# Patient Record
Sex: MALE | Race: WHITE | NOT HISPANIC OR LATINO | Employment: OTHER | URBAN - METROPOLITAN AREA
[De-identification: names, ages, dates, MRNs, and addresses within clinical notes are randomized per-mention and may not be internally consistent; named-entity substitution may affect disease eponyms.]

---

## 2017-01-13 ENCOUNTER — HISTORICAL (OUTPATIENT)
Dept: RESPIRATORY THERAPY | Facility: HOSPITAL | Age: 77
End: 2017-01-13

## 2017-01-23 ENCOUNTER — HISTORICAL (OUTPATIENT)
Dept: LAB | Facility: HOSPITAL | Age: 77
End: 2017-01-23

## 2017-01-30 ENCOUNTER — HISTORICAL (OUTPATIENT)
Dept: LAB | Facility: HOSPITAL | Age: 77
End: 2017-01-30

## 2017-03-13 ENCOUNTER — HISTORICAL (OUTPATIENT)
Dept: LAB | Facility: HOSPITAL | Age: 77
End: 2017-03-13

## 2017-04-13 ENCOUNTER — HISTORICAL (OUTPATIENT)
Dept: LAB | Facility: HOSPITAL | Age: 77
End: 2017-04-13

## 2017-06-12 ENCOUNTER — HISTORICAL (OUTPATIENT)
Dept: RADIOLOGY | Facility: HOSPITAL | Age: 77
End: 2017-06-12

## 2017-06-28 ENCOUNTER — HISTORICAL (OUTPATIENT)
Dept: LAB | Facility: HOSPITAL | Age: 77
End: 2017-06-28

## 2017-06-28 LAB
INR PPP: 1.78
PROTHROMBIN TIME: 18.1 SECOND(S) (ref 9–11.5)

## 2017-11-15 ENCOUNTER — HISTORICAL (OUTPATIENT)
Dept: RADIOLOGY | Facility: HOSPITAL | Age: 77
End: 2017-11-15

## 2017-11-15 LAB
BUN SERPL-MCNC: 25 MG/DL (ref 7–18)
CALCIUM SERPL-MCNC: 9.2 MG/DL (ref 8.5–10.1)
CHLORIDE SERPL-SCNC: 104 MMOL/L (ref 98–107)
CO2 SERPL-SCNC: 31.1 MMOL/L (ref 21–32)
CREAT SERPL-MCNC: 1.3 MG/DL (ref 0.7–1.3)
DIGOXIN SERPL-MCNC: 1.12 NG/ML (ref 0.9–2)
GLUCOSE SERPL-MCNC: 100 MG/DL (ref 74–106)
POTASSIUM SERPL-SCNC: 4.4 MMOL/L (ref 3.5–5.1)
SODIUM SERPL-SCNC: 141 MMOL/L (ref 136–145)
TSH SERPL-ACNC: 3.22 MIU/ML (ref 0.35–3.75)

## 2018-04-03 ENCOUNTER — HISTORICAL (OUTPATIENT)
Dept: RADIOLOGY | Facility: HOSPITAL | Age: 78
End: 2018-04-03

## 2018-04-24 ENCOUNTER — HISTORICAL (OUTPATIENT)
Dept: LAB | Facility: HOSPITAL | Age: 78
End: 2018-04-24

## 2018-04-24 LAB
BUN SERPL-MCNC: 19 MG/DL (ref 7–18)
CALCIUM SERPL-MCNC: 9 MG/DL (ref 8.5–10.1)
CHLORIDE SERPL-SCNC: 104 MMOL/L (ref 98–107)
CO2 SERPL-SCNC: 28.2 MMOL/L (ref 21–32)
CREAT SERPL-MCNC: 1.01 MG/DL (ref 0.7–1.3)
CREAT/UREA NIT SERPL: 19
GLUCOSE SERPL-MCNC: 110 MG/DL (ref 74–106)
POTASSIUM SERPL-SCNC: 4.2 MMOL/L (ref 3.5–5.1)
SODIUM SERPL-SCNC: 138 MMOL/L (ref 136–145)

## 2018-05-30 ENCOUNTER — HISTORICAL (OUTPATIENT)
Dept: LAB | Facility: HOSPITAL | Age: 78
End: 2018-05-30

## 2018-05-30 LAB
INR PPP: 2.6
PROTHROMBIN TIME: 26.8 SECOND(S) (ref 9.3–11.4)

## 2018-07-20 ENCOUNTER — HISTORICAL (OUTPATIENT)
Dept: LAB | Facility: HOSPITAL | Age: 78
End: 2018-07-20

## 2018-07-20 LAB
BUN SERPL-MCNC: 28 MG/DL (ref 7–18)
CALCIUM SERPL-MCNC: 8.7 MG/DL (ref 8.5–10.1)
CHLORIDE SERPL-SCNC: 104 MMOL/L (ref 98–107)
CO2 SERPL-SCNC: 26 MMOL/L (ref 21–32)
CREAT SERPL-MCNC: 1.14 MG/DL (ref 0.7–1.3)
CREAT/UREA NIT SERPL: 25
GLUCOSE SERPL-MCNC: 136 MG/DL (ref 74–106)
POTASSIUM SERPL-SCNC: 4 MMOL/L (ref 3.5–5.1)
SODIUM SERPL-SCNC: 139 MMOL/L (ref 136–145)

## 2018-10-11 ENCOUNTER — HISTORICAL (OUTPATIENT)
Dept: LAB | Facility: HOSPITAL | Age: 78
End: 2018-10-11

## 2018-10-11 LAB
INR PPP: 3
PROTHROMBIN TIME: 30.2 SECOND(S) (ref 9.3–11.4)

## 2018-10-24 ENCOUNTER — TELEPHONE (OUTPATIENT)
Dept: ELECTROPHYSIOLOGY | Facility: CLINIC | Age: 78
End: 2018-10-24

## 2018-10-24 RX ORDER — FUROSEMIDE 40 MG/1
40 TABLET ORAL DAILY
Refills: 5 | Status: ON HOLD | COMMUNITY
Start: 2018-09-12 | End: 2018-11-13 | Stop reason: SDUPTHER

## 2018-10-24 RX ORDER — DIGOXIN 125 MCG
125 TABLET ORAL DAILY
COMMUNITY

## 2018-10-24 RX ORDER — DOCUSATE SODIUM 100 MG/1
100 CAPSULE, LIQUID FILLED ORAL 2 TIMES DAILY PRN
COMMUNITY

## 2018-10-24 RX ORDER — ACETAMINOPHEN 500 MG
1 TABLET ORAL DAILY
COMMUNITY

## 2018-10-24 RX ORDER — HYDROCODONE BITARTRATE AND ACETAMINOPHEN 5; 325 MG/1; MG/1
1 TABLET ORAL
Refills: 0 | Status: ON HOLD | COMMUNITY
Start: 2018-10-02 | End: 2018-11-13 | Stop reason: HOSPADM

## 2018-10-24 RX ORDER — DILTIAZEM HYDROCHLORIDE 360 MG/1
360 CAPSULE, EXTENDED RELEASE ORAL DAILY
Refills: 6 | Status: ON HOLD | COMMUNITY
Start: 2018-10-16 | End: 2018-11-13 | Stop reason: HOSPADM

## 2018-10-24 RX ORDER — LEVOTHYROXINE SODIUM 50 UG/1
50 CAPSULE ORAL DAILY
COMMUNITY

## 2018-10-24 RX ORDER — ATORVASTATIN CALCIUM 80 MG/1
40 TABLET, FILM COATED ORAL NIGHTLY
COMMUNITY

## 2018-10-24 RX ORDER — CARVEDILOL 25 MG/1
25 TABLET ORAL 2 TIMES DAILY
Refills: 6 | Status: ON HOLD | COMMUNITY
Start: 2018-10-09 | End: 2018-11-13 | Stop reason: HOSPADM

## 2018-10-24 RX ORDER — AMIODARONE HYDROCHLORIDE 200 MG/1
200 TABLET ORAL DAILY
Refills: 6 | COMMUNITY
Start: 2018-10-22

## 2018-10-24 RX ORDER — WARFARIN 2.5 MG/1
2.5 TABLET ORAL
COMMUNITY

## 2018-10-24 RX ORDER — CEPHALEXIN 500 MG/1
500 CAPSULE ORAL DAILY
Refills: 0 | COMMUNITY
Start: 2018-10-02 | End: 2018-10-29

## 2018-10-24 RX ORDER — PRAMIPEXOLE DIHYDROCHLORIDE 0.12 MG/1
0.12 TABLET ORAL NIGHTLY
Refills: 5 | COMMUNITY
Start: 2018-09-09 | End: 2018-10-29

## 2018-10-24 RX ORDER — PAROXETINE HYDROCHLORIDE 20 MG/1
20 TABLET, FILM COATED ORAL 2 TIMES DAILY
Refills: 3 | COMMUNITY
Start: 2018-08-16

## 2018-10-24 RX ORDER — EPLERENONE 25 MG/1
25 TABLET, FILM COATED ORAL DAILY
Refills: 3 | COMMUNITY
Start: 2018-10-05

## 2018-10-24 RX ORDER — MULTIVITAMIN
1 TABLET ORAL DAILY
COMMUNITY

## 2018-10-24 RX ORDER — IBUPROFEN 100 MG/5ML
1000 SUSPENSION, ORAL (FINAL DOSE FORM) ORAL DAILY
COMMUNITY

## 2018-10-24 NOTE — TELEPHONE ENCOUNTER
Per Lesia Stearns.  I called  And spoke to Mrs. Mathews , and we scheduled her  an appointment to come in and see  .  Patients  Wife requested 11-08-18.  Dr. Cory Mehta referred patient to be evaluated for RFA.  All  Patient medical records here in Epic.

## 2018-10-29 ENCOUNTER — INITIAL CONSULT (OUTPATIENT)
Dept: ELECTROPHYSIOLOGY | Facility: CLINIC | Age: 78
End: 2018-10-29
Payer: MEDICARE

## 2018-10-29 ENCOUNTER — TELEPHONE (OUTPATIENT)
Dept: ELECTROPHYSIOLOGY | Facility: CLINIC | Age: 78
End: 2018-10-29

## 2018-10-29 ENCOUNTER — HOSPITAL ENCOUNTER (OUTPATIENT)
Dept: CARDIOLOGY | Facility: CLINIC | Age: 78
Discharge: HOME | End: 2018-10-29
Payer: MEDICARE

## 2018-10-29 VITALS
WEIGHT: 169.06 LBS | SYSTOLIC BLOOD PRESSURE: 110 MMHG | DIASTOLIC BLOOD PRESSURE: 72 MMHG | BODY MASS INDEX: 22.9 KG/M2 | HEIGHT: 72 IN | HEART RATE: 87 BPM

## 2018-10-29 DIAGNOSIS — Z95.2 S/P AVR (AORTIC VALVE REPLACEMENT): ICD-10-CM

## 2018-10-29 DIAGNOSIS — I47.20 VENTRICULAR TACHYCARDIA: Primary | ICD-10-CM

## 2018-10-29 DIAGNOSIS — I49.9 CARDIAC ARRHYTHMIA, UNSPECIFIED CARDIAC ARRHYTHMIA TYPE: ICD-10-CM

## 2018-10-29 DIAGNOSIS — Z79.01 CURRENT USE OF LONG TERM ANTICOAGULATION: ICD-10-CM

## 2018-10-29 DIAGNOSIS — I42.9 CARDIOMYOPATHY, UNSPECIFIED TYPE: ICD-10-CM

## 2018-10-29 DIAGNOSIS — I47.20 VENTRICULAR TACHYCARDIA: ICD-10-CM

## 2018-10-29 DIAGNOSIS — I47.20 VT (VENTRICULAR TACHYCARDIA): Primary | ICD-10-CM

## 2018-10-29 DIAGNOSIS — Z95.1 HX OF CABG: ICD-10-CM

## 2018-10-29 DIAGNOSIS — I48.20 CHRONIC ATRIAL FIBRILLATION: ICD-10-CM

## 2018-10-29 PROCEDURE — 99999 PR PBB SHADOW E&M-EST. PATIENT-LVL IV: CPT | Mod: PBBFAC,,, | Performed by: INTERNAL MEDICINE

## 2018-10-29 PROCEDURE — 93010 ELECTROCARDIOGRAM REPORT: CPT | Mod: S$PBB,,, | Performed by: INTERNAL MEDICINE

## 2018-10-29 PROCEDURE — 93005 ELECTROCARDIOGRAM TRACING: CPT | Mod: PBBFAC | Performed by: INTERNAL MEDICINE

## 2018-10-29 PROCEDURE — 99214 OFFICE O/P EST MOD 30 MIN: CPT | Mod: PBBFAC | Performed by: INTERNAL MEDICINE

## 2018-10-29 PROCEDURE — 99205 OFFICE O/P NEW HI 60 MIN: CPT | Mod: S$PBB,,, | Performed by: INTERNAL MEDICINE

## 2018-10-29 RX ORDER — MELATONIN 5 MG
5 CAPSULE ORAL NIGHTLY
COMMUNITY
End: 2019-02-18

## 2018-10-29 NOTE — PROGRESS NOTES
"Subjective:    Patient ID:  Donavan Mathews is a 78 y.o. male who presents for {Misc; evaluation/follow-up:20501::"follow-up"} of No chief complaint on file.      HPI    Referred by Dr. Jose Mehta [phone]    Ischemic cardiomyopathy, device placed at [facility], subsequent infection and removal, a couple surgeries, now has CRT-D. Has also seen Lanette Cabrera in the past. Also restrictive lung disease possibly due to Amio. Off Amio 5 months ago and maxed out on beta blockers.     Now getting shocked for MMVT. Good sized inferior scar. Back on Amio (loaded about 1 week ago). Still having VT.        ROS     Objective:    Physical Exam      Assessment:       No diagnosis found.     Plan:                   "

## 2018-10-29 NOTE — LETTER
October 29, 2018      Cory Mehta MD  [address] [place] [address] Dr Meaghan [address] [id number]           [facility]  [address]  [facility] [address]  Phone: [phone]  Fax: [phone]          Patient: Donavan Mathews   MR Number: [id number]   YOB: 1940   Date of Visit: 10/29/2018       Dear Dr. Cory Mehta:    Thank you for referring Donavan Mathews to me for evaluation. Attached you will find relevant portions of my assessment and plan of care.    If you have questions, please do not hesitate to call me. I look forward to following Donavan Mathews along with you.    Sincerely,    Madi Aguayo MD    Enclosure  CC:  No Recipients    If you would like to receive this communication electronically, please contact [email or web address] or [phone] to request more information on [employer] Link access.    For providers and/or their staff who would like to refer a patient to [facility], please contact us through our one-stop-shop provider referral line, [facility] , at [phone].    If you feel you have received this communication in error or would no longer like to receive these types of communications, please e-mail [email or web address]
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
## 2018-11-01 DIAGNOSIS — I48.20 CHRONIC ATRIAL FIBRILLATION: Primary | ICD-10-CM

## 2018-11-02 ENCOUNTER — TELEPHONE (OUTPATIENT)
Dept: ELECTROPHYSIOLOGY | Facility: CLINIC | Age: 78
End: 2018-11-02

## 2018-11-02 ENCOUNTER — HISTORICAL (OUTPATIENT)
Dept: LAB | Facility: HOSPITAL | Age: 78
End: 2018-11-02

## 2018-11-02 LAB
ABS NEUT (OLG): 7.22
APTT PPP: 40.5 SECOND(S) (ref 24.5–32.8)
BASOPHILS # BLD AUTO: 0.01 X10(3)/MCL
BASOPHILS NFR BLD AUTO: 0.1 %
BUN SERPL-MCNC: 26 MG/DL (ref 7–18)
CALCIUM SERPL-MCNC: 8.7 MG/DL (ref 8.5–10.1)
CHLORIDE SERPL-SCNC: 102 MMOL/L (ref 98–107)
CO2 SERPL-SCNC: 29.5 MMOL/L (ref 21–32)
CREAT SERPL-MCNC: 1.23 MG/DL (ref 0.7–1.3)
CREAT/UREA NIT SERPL: 21
EOSINOPHIL # BLD AUTO: 0.11 X10(3)/MCL
EOSINOPHIL NFR BLD AUTO: 1.2 %
ERYTHROCYTE [DISTWIDTH] IN BLOOD BY AUTOMATED COUNT: 15 %
GLUCOSE SERPL-MCNC: 89 MG/DL (ref 74–106)
HCT VFR BLD AUTO: 43.8 % (ref 39–49)
HGB BLD-MCNC: 14.9 GM/DL (ref 12.6–16.6)
IMM GRANULOCYTES # BLD AUTO: 0.02 10*3/UL (ref 0–0.1)
IMM GRANULOCYTES NFR BLD AUTO: 0.2 % (ref 0–1)
INR PPP: 4.2
LYMPHOCYTES # BLD AUTO: 0.71 X10(3)/MCL
LYMPHOCYTES NFR BLD AUTO: 7.9 %
MCH RBC QN AUTO: 30.8 PG (ref 27–33)
MCHC RBC AUTO-ENTMCNC: 34 GM/DL (ref 32–35)
MCV RBC AUTO: 90.7 FL (ref 84–97)
MONOCYTES # BLD AUTO: 0.92 X10(3)/MCL
MONOCYTES NFR BLD AUTO: 10.2 %
NEUTROPHILS # BLD AUTO: 7.22 X10(3)/MCL
NEUTROPHILS NFR BLD AUTO: 80.4 %
PLATELET # BLD AUTO: 183 X10(3)/MCL (ref 151–368)
PMV BLD AUTO: 11 FL
POTASSIUM SERPL-SCNC: 4.1 MMOL/L (ref 3.5–5.1)
PROTHROMBIN TIME: 37.5 SECOND(S) (ref 8.6–10.1)
RBC # BLD AUTO: 4.83 X10(6)/MCL (ref 4.3–5.6)
SODIUM SERPL-SCNC: 138 MMOL/L (ref 136–145)
WBC # SPEC AUTO: 8.99 X10(3)/MCL (ref 3.4–9.2)

## 2018-11-02 NOTE — TELEPHONE ENCOUNTER
Returned call to Consuelo. Orders faxed over with signatures. Electronic orders not accepted.         ----- Message from Sabrina Gaspar MA sent at 11/2/2018  8:13 AM CDT -----  Contact: Consuelo Cordero from [facility] would like to talk to you about this patient. Please call [phone]. Thank you.

## 2018-11-07 ENCOUNTER — ANESTHESIA (OUTPATIENT)
Dept: MEDSURG UNIT | Facility: HOSPITAL | Age: 78
DRG: 273 | End: 2018-11-07
Payer: MEDICARE

## 2018-11-07 ENCOUNTER — HOSPITAL ENCOUNTER (INPATIENT)
Facility: HOSPITAL | Age: 78
LOS: 6 days | Discharge: HOME | DRG: 273 | End: 2018-11-13
Attending: INTERNAL MEDICINE | Admitting: INTERNAL MEDICINE
Payer: MEDICARE

## 2018-11-07 ENCOUNTER — ANESTHESIA EVENT (OUTPATIENT)
Dept: MEDSURG UNIT | Facility: HOSPITAL | Age: 78
DRG: 273 | End: 2018-11-07
Payer: MEDICARE

## 2018-11-07 ENCOUNTER — HOSPITAL ENCOUNTER (OUTPATIENT)
Dept: CARDIOLOGY | Facility: CLINIC | Age: 78
Discharge: HOME | DRG: 273 | End: 2018-11-07
Attending: INTERNAL MEDICINE
Payer: MEDICARE

## 2018-11-07 VITALS
SYSTOLIC BLOOD PRESSURE: 110 MMHG | WEIGHT: 169 LBS | HEIGHT: 72 IN | DIASTOLIC BLOOD PRESSURE: 72 MMHG | BODY MASS INDEX: 22.89 KG/M2

## 2018-11-07 DIAGNOSIS — I49.9 ARRHYTHMIA: ICD-10-CM

## 2018-11-07 DIAGNOSIS — I47.20 VT (VENTRICULAR TACHYCARDIA): Primary | ICD-10-CM

## 2018-11-07 DIAGNOSIS — I42.9 CARDIOMYOPATHY, UNSPECIFIED TYPE: ICD-10-CM

## 2018-11-07 DIAGNOSIS — I47.20 VT (VENTRICULAR TACHYCARDIA): ICD-10-CM

## 2018-11-07 DIAGNOSIS — Z95.1 HX OF CABG: ICD-10-CM

## 2018-11-07 DIAGNOSIS — I49.9 CARDIAC ARRHYTHMIA, UNSPECIFIED CARDIAC ARRHYTHMIA TYPE: ICD-10-CM

## 2018-11-07 LAB
ABO + RH BLD: NORMAL
AORTIC ARCH: 5.5 CM
ASCENDING AORTA: 5.09 CM
AV MEAN GRADIENT: 5.75 MMHG
AV PEAK GRADIENT: 11.02 MMHG
AV VALVE AREA: 2.04 CM2
BLD GP AB SCN CELLS X3 SERPL QL: NORMAL
BSA FOR ECHO PROCEDURE: 1.97 M2
CV ECHO LV RWT: 0.39 CM
DOP CALC AO PEAK VEL: 1.66 M/S
DOP CALC AO VTI: 29.19 CM
DOP CALC LVOT AREA: 4.67 CM2
DOP CALC LVOT DIAMETER: 2.44 CM
DOP CALC LVOT STROKE VOLUME: 59.45 CM3
DOP CALCLVOT PEAK VEL VTI: 12.72 CM
E/E' RATIO: 15.2
ECHO LV POSTERIOR WALL: 1.14 CM (ref 0.6–1.1)
FRACTIONAL SHORTENING: 17 % (ref 28–44)
INR PPP: 3
INTERVENTRICULAR SEPTUM: 1.14 CM (ref 0.6–1.1)
LA MAJOR: 7.05 CM
LA MINOR: 6.94 CM
LA WIDTH: 5.14 CM
LEFT ATRIUM SIZE: 5.43 CM
LEFT ATRIUM VOLUME INDEX: 84.2 ML/M2
LEFT ATRIUM VOLUME: 165.94 CM3
LEFT INTERNAL DIMENSION IN SYSTOLE: 4.82 CM (ref 2.1–4)
LEFT VENTRICLE DIASTOLIC VOLUME INDEX: 85.96 ML/M2
LEFT VENTRICLE DIASTOLIC VOLUME: 169.35 ML
LEFT VENTRICLE MASS INDEX: 142.3 G/M2
LEFT VENTRICLE SYSTOLIC VOLUME INDEX: 55 ML/M2
LEFT VENTRICLE SYSTOLIC VOLUME: 108.35 ML
LEFT VENTRICULAR INTERNAL DIMENSION IN DIASTOLE: 5.84 CM (ref 3.5–6)
LEFT VENTRICULAR MASS: 280.33 G
LV LATERAL E/E' RATIO: 11.4
LV SEPTAL E/E' RATIO: 22.8
MV PEAK E VEL: 1.14 M/S
PISA TR MAX VEL: 3.19 M/S
PROTHROMBIN TIME: 29.2 SEC
PROX AORTA: 5 CM
PULM VEIN S/D RATIO: 0.65
PV PEAK D VEL: 0.78 M/S
PV PEAK S VEL: 0.51 M/S
RA MAJOR: 6.16 CM
RA PRESSURE: 8 MMHG
RA WIDTH: 4.64 CM
RIGHT VENTRICULAR END-DIASTOLIC DIMENSION: 4.11 CM
SINUS: 3.45 CM
STJ: 2.67 CM
TDI LATERAL: 0.1
TDI SEPTAL: 0.05
TDI: 0.08
TR MAX PG: 40.7 MMHG
TRICUSPID ANNULAR PLANE SYSTOLIC EXCURSION: 1.66 CM
TV REST PULMONARY ARTERY PRESSURE: 48.7 MMHG

## 2018-11-07 PROCEDURE — 27200188 HC TRANSDUCER, ART ADULT/PEDS

## 2018-11-07 PROCEDURE — C1732 CATH, EP, DIAG/ABL, 3D/VECT: HCPCS | Performed by: INTERNAL MEDICINE

## 2018-11-07 PROCEDURE — 25500020 PHARM REV CODE 255: Performed by: INTERNAL MEDICINE

## 2018-11-07 PROCEDURE — 93306 TTE W/DOPPLER COMPLETE: CPT | Mod: 26,S$PBB,, | Performed by: INTERNAL MEDICINE

## 2018-11-07 PROCEDURE — 63600175 PHARM REV CODE 636 W HCPCS: Performed by: NURSE ANESTHETIST, CERTIFIED REGISTERED

## 2018-11-07 PROCEDURE — 36620 INSERTION CATHETER ARTERY: CPT | Mod: 59,,, | Performed by: ANESTHESIOLOGY

## 2018-11-07 PROCEDURE — 51701 INSERT BLADDER CATHETER: CPT

## 2018-11-07 PROCEDURE — 92960 CARDIOVERSION ELECTRIC EXT: CPT | Mod: 59,,, | Performed by: INTERNAL MEDICINE

## 2018-11-07 PROCEDURE — 33225 L VENTRIC PACING LEAD ADD-ON: CPT

## 2018-11-07 PROCEDURE — 02583ZZ DESTRUCTION OF CONDUCTION MECHANISM, PERCUTANEOUS APPROACH: ICD-10-PCS | Performed by: INTERNAL MEDICINE

## 2018-11-07 PROCEDURE — 94761 N-INVAS EAR/PLS OXIMETRY MLT: CPT

## 2018-11-07 PROCEDURE — 93005 ELECTROCARDIOGRAM TRACING: CPT

## 2018-11-07 PROCEDURE — 27201423 OPTIME MED/SURG SUP & DEVICES STERILE SUPPLY: Performed by: INTERNAL MEDICINE

## 2018-11-07 PROCEDURE — 25000003 PHARM REV CODE 250: Performed by: NURSE ANESTHETIST, CERTIFIED REGISTERED

## 2018-11-07 PROCEDURE — 93010 ELECTROCARDIOGRAM REPORT: CPT | Mod: ,,, | Performed by: INTERNAL MEDICINE

## 2018-11-07 PROCEDURE — 93662 INTRACARDIAC ECG (ICE): CPT | Performed by: INTERNAL MEDICINE

## 2018-11-07 PROCEDURE — 27000221 HC OXYGEN, UP TO 24 HOURS

## 2018-11-07 PROCEDURE — 25000003 PHARM REV CODE 250: Performed by: ANESTHESIOLOGY

## 2018-11-07 PROCEDURE — C1894 INTRO/SHEATH, NON-LASER: HCPCS | Performed by: INTERNAL MEDICINE

## 2018-11-07 PROCEDURE — 93621 COMP EP EVL L PAC&REC C SINS: CPT | Performed by: INTERNAL MEDICINE

## 2018-11-07 PROCEDURE — 93662 INTRACARDIAC ECG (ICE): CPT | Mod: 26,,, | Performed by: INTERNAL MEDICINE

## 2018-11-07 PROCEDURE — C1753 CATH, INTRAVAS ULTRASOUND: HCPCS | Performed by: INTERNAL MEDICINE

## 2018-11-07 PROCEDURE — C1894 INTRO/SHEATH, NON-LASER: HCPCS

## 2018-11-07 PROCEDURE — 86850 RBC ANTIBODY SCREEN: CPT

## 2018-11-07 PROCEDURE — C1730 CATH, EP, 19 OR FEW ELECT: HCPCS | Performed by: INTERNAL MEDICINE

## 2018-11-07 PROCEDURE — 93462 L HRT CATH TRNSPTL PUNCTURE: CPT | Performed by: INTERNAL MEDICINE

## 2018-11-07 PROCEDURE — C1725 CATH, TRANSLUMIN NON-LASER: HCPCS | Performed by: INTERNAL MEDICINE

## 2018-11-07 PROCEDURE — 20000000 HC ICU ROOM

## 2018-11-07 PROCEDURE — D9220A PRA ANESTHESIA: Mod: ANES,,, | Performed by: ANESTHESIOLOGY

## 2018-11-07 PROCEDURE — 85610 PROTHROMBIN TIME: CPT

## 2018-11-07 PROCEDURE — 25000003 PHARM REV CODE 250: Performed by: INTERNAL MEDICINE

## 2018-11-07 PROCEDURE — 36620 INSERTION CATHETER ARTERY: CPT

## 2018-11-07 PROCEDURE — 63600175 PHARM REV CODE 636 W HCPCS: Performed by: INTERNAL MEDICINE

## 2018-11-07 PROCEDURE — 37000008 HC ANESTHESIA 1ST 15 MINUTES: Performed by: INTERNAL MEDICINE

## 2018-11-07 PROCEDURE — C8929 TTE W OR WO FOL WCON,DOPPLER: HCPCS | Mod: PBBFAC | Performed by: INTERNAL MEDICINE

## 2018-11-07 PROCEDURE — D9220A PRA ANESTHESIA: Mod: CRNA,,, | Performed by: NURSE ANESTHETIST, CERTIFIED REGISTERED

## 2018-11-07 PROCEDURE — 02K83ZZ MAP CONDUCTION MECHANISM, PERCUTANEOUS APPROACH: ICD-10-PCS | Performed by: INTERNAL MEDICINE

## 2018-11-07 PROCEDURE — 93010 ELECTROCARDIOGRAM REPORT: CPT | Mod: 76,,, | Performed by: INTERNAL MEDICINE

## 2018-11-07 PROCEDURE — 27201037 HC PRESSURE MONITORING SET UP

## 2018-11-07 PROCEDURE — 93462 L HRT CATH TRNSPTL PUNCTURE: CPT | Mod: ,,, | Performed by: INTERNAL MEDICINE

## 2018-11-07 PROCEDURE — 37000009 HC ANESTHESIA EA ADD 15 MINS: Performed by: INTERNAL MEDICINE

## 2018-11-07 PROCEDURE — 92960 CARDIOVERSION ELECTRIC EXT: CPT | Mod: 59 | Performed by: INTERNAL MEDICINE

## 2018-11-07 PROCEDURE — 63600175 PHARM REV CODE 636 W HCPCS: Performed by: ANESTHESIOLOGY

## 2018-11-07 PROCEDURE — 93654 COMPRE EP EVAL TX VT: CPT | Performed by: INTERNAL MEDICINE

## 2018-11-07 PROCEDURE — 94002 VENT MGMT INPAT INIT DAY: CPT

## 2018-11-07 PROCEDURE — 51702 INSERT TEMP BLADDER CATH: CPT

## 2018-11-07 PROCEDURE — 99900026 HC AIRWAY MAINTENANCE (STAT)

## 2018-11-07 PROCEDURE — 93621 COMP EP EVL L PAC&REC C SINS: CPT | Mod: 26,,, | Performed by: INTERNAL MEDICINE

## 2018-11-07 PROCEDURE — 93654 COMPRE EP EVAL TX VT: CPT | Mod: ,,, | Performed by: INTERNAL MEDICINE

## 2018-11-07 PROCEDURE — 5A2204Z RESTORATION OF CARDIAC RHYTHM, SINGLE: ICD-10-PCS | Performed by: INTERNAL MEDICINE

## 2018-11-07 RX ORDER — PROTAMINE SULFATE 10 MG/ML
INJECTION, SOLUTION INTRAVENOUS
Status: DISCONTINUED | OUTPATIENT
Start: 2018-11-07 | End: 2018-11-07

## 2018-11-07 RX ORDER — HEPARIN SODIUM 1000 [USP'U]/ML
INJECTION, SOLUTION INTRAVENOUS; SUBCUTANEOUS
Status: DISCONTINUED | OUTPATIENT
Start: 2018-11-07 | End: 2018-11-07

## 2018-11-07 RX ORDER — FENTANYL CITRATE 50 UG/ML
25 INJECTION, SOLUTION INTRAMUSCULAR; INTRAVENOUS EVERY 5 MIN PRN
Status: DISCONTINUED | OUTPATIENT
Start: 2018-11-07 | End: 2018-11-07 | Stop reason: HOSPADM

## 2018-11-07 RX ORDER — EPINEPHRINE 0.1 MG/ML
INJECTION INTRAVENOUS
Status: DISCONTINUED | OUTPATIENT
Start: 2018-11-07 | End: 2018-11-07

## 2018-11-07 RX ORDER — ONDANSETRON 2 MG/ML
4 INJECTION INTRAMUSCULAR; INTRAVENOUS DAILY PRN
Status: DISCONTINUED | OUTPATIENT
Start: 2018-11-07 | End: 2018-11-07 | Stop reason: HOSPADM

## 2018-11-07 RX ORDER — ROCURONIUM BROMIDE 10 MG/ML
INJECTION, SOLUTION INTRAVENOUS
Status: DISCONTINUED | OUTPATIENT
Start: 2018-11-07 | End: 2018-11-07

## 2018-11-07 RX ORDER — PAROXETINE HYDROCHLORIDE 20 MG/1
20 TABLET, FILM COATED ORAL DAILY
Status: DISCONTINUED | OUTPATIENT
Start: 2018-11-08 | End: 2018-11-13 | Stop reason: HOSPADM

## 2018-11-07 RX ORDER — LEVOTHYROXINE SODIUM 75 UG/1
75 TABLET ORAL DAILY
Status: DISCONTINUED | OUTPATIENT
Start: 2018-11-08 | End: 2018-11-13 | Stop reason: HOSPADM

## 2018-11-07 RX ORDER — SODIUM CHLORIDE 9 MG/ML
INJECTION, SOLUTION INTRAVENOUS CONTINUOUS
Status: DISCONTINUED | OUTPATIENT
Start: 2018-11-07 | End: 2018-11-08

## 2018-11-07 RX ORDER — SODIUM CHLORIDE 0.9 % (FLUSH) 0.9 %
3 SYRINGE (ML) INJECTION
Status: DISCONTINUED | OUTPATIENT
Start: 2018-11-07 | End: 2018-11-13 | Stop reason: HOSPADM

## 2018-11-07 RX ORDER — LIDOCAINE HYDROCHLORIDE 20 MG/ML
INJECTION, SOLUTION INFILTRATION; PERINEURAL
Status: DISCONTINUED | OUTPATIENT
Start: 2018-11-07 | End: 2018-11-07 | Stop reason: HOSPADM

## 2018-11-07 RX ORDER — PROPOFOL 10 MG/ML
VIAL (ML) INTRAVENOUS CONTINUOUS PRN
Status: DISCONTINUED | OUTPATIENT
Start: 2018-11-07 | End: 2018-11-07

## 2018-11-07 RX ORDER — AMIODARONE HYDROCHLORIDE 200 MG/1
200 TABLET ORAL DAILY
Status: DISCONTINUED | OUTPATIENT
Start: 2018-11-07 | End: 2018-11-13 | Stop reason: HOSPADM

## 2018-11-07 RX ORDER — SODIUM CHLORIDE 9 MG/ML
INJECTION, SOLUTION INTRAVENOUS CONTINUOUS PRN
Status: DISCONTINUED | OUTPATIENT
Start: 2018-11-07 | End: 2018-11-07

## 2018-11-07 RX ORDER — MIDAZOLAM HYDROCHLORIDE 1 MG/ML
INJECTION, SOLUTION INTRAMUSCULAR; INTRAVENOUS
Status: DISCONTINUED | OUTPATIENT
Start: 2018-11-07 | End: 2018-11-07

## 2018-11-07 RX ORDER — HEPARIN SODIUM 10000 [USP'U]/100ML
INJECTION, SOLUTION INTRAVENOUS CONTINUOUS PRN
Status: DISCONTINUED | OUTPATIENT
Start: 2018-11-07 | End: 2018-11-07

## 2018-11-07 RX ORDER — CEFAZOLIN SODIUM 1 G/3ML
2 INJECTION, POWDER, FOR SOLUTION INTRAMUSCULAR; INTRAVENOUS ONCE
Status: COMPLETED | OUTPATIENT
Start: 2018-11-07 | End: 2018-11-07

## 2018-11-07 RX ORDER — FUROSEMIDE 10 MG/ML
40 INJECTION INTRAMUSCULAR; INTRAVENOUS ONCE
Status: DISCONTINUED | OUTPATIENT
Start: 2018-11-07 | End: 2018-11-13 | Stop reason: HOSPADM

## 2018-11-07 RX ORDER — WARFARIN 2.5 MG/1
2.5 TABLET ORAL DAILY
Status: DISCONTINUED | OUTPATIENT
Start: 2018-11-08 | End: 2018-11-08

## 2018-11-07 RX ORDER — FUROSEMIDE 10 MG/ML
INJECTION INTRAMUSCULAR; INTRAVENOUS
Status: DISCONTINUED | OUTPATIENT
Start: 2018-11-07 | End: 2018-11-07

## 2018-11-07 RX ORDER — FENTANYL CITRATE 50 UG/ML
INJECTION, SOLUTION INTRAMUSCULAR; INTRAVENOUS
Status: DISCONTINUED | OUTPATIENT
Start: 2018-11-07 | End: 2018-11-07

## 2018-11-07 RX ORDER — ETOMIDATE 2 MG/ML
INJECTION INTRAVENOUS
Status: DISCONTINUED | OUTPATIENT
Start: 2018-11-07 | End: 2018-11-07

## 2018-11-07 RX ORDER — HEPARIN SODIUM 200 [USP'U]/100ML
INJECTION, SOLUTION INTRAVENOUS
Status: DISCONTINUED | OUTPATIENT
Start: 2018-11-07 | End: 2018-11-09

## 2018-11-07 RX ORDER — FUROSEMIDE 40 MG/1
40 TABLET ORAL DAILY
Status: DISCONTINUED | OUTPATIENT
Start: 2018-11-08 | End: 2018-11-09

## 2018-11-07 RX ORDER — EPLERENONE 25 MG/1
25 TABLET, FILM COATED ORAL DAILY
Status: DISCONTINUED | OUTPATIENT
Start: 2018-11-08 | End: 2018-11-13 | Stop reason: HOSPADM

## 2018-11-07 RX ORDER — DIGOXIN 125 MCG
125 TABLET ORAL DAILY
Status: DISCONTINUED | OUTPATIENT
Start: 2018-11-08 | End: 2018-11-13 | Stop reason: HOSPADM

## 2018-11-07 RX ORDER — IODIXANOL 320 MG/ML
INJECTION, SOLUTION INTRAVASCULAR
Status: DISCONTINUED | OUTPATIENT
Start: 2018-11-07 | End: 2018-11-07 | Stop reason: HOSPADM

## 2018-11-07 RX ADMIN — MIDAZOLAM 2 MG: 1 INJECTION INTRAMUSCULAR; INTRAVENOUS at 07:11

## 2018-11-07 RX ADMIN — PROTAMINE SULFATE 10 MG: 10 INJECTION, SOLUTION INTRAVENOUS at 06:11

## 2018-11-07 RX ADMIN — EPINEPHRINE 0.02 MCG/KG/MIN: 1 INJECTION INTRAMUSCULAR; INTRAVENOUS; SUBCUTANEOUS at 04:11

## 2018-11-07 RX ADMIN — PROPOFOL 25 MCG/KG/MIN: 10 INJECTION, EMULSION INTRAVENOUS at 06:11

## 2018-11-07 RX ADMIN — HEPARIN SODIUM 1000 UNITS: 1000 INJECTION INTRAVENOUS; SUBCUTANEOUS at 05:11

## 2018-11-07 RX ADMIN — SODIUM CHLORIDE, SODIUM GLUCONATE, SODIUM ACETATE, POTASSIUM CHLORIDE, MAGNESIUM CHLORIDE, SODIUM PHOSPHATE, DIBASIC, AND POTASSIUM PHOSPHATE: .53; .5; .37; .037; .03; .012; .00082 INJECTION, SOLUTION INTRAVENOUS at 03:11

## 2018-11-07 RX ADMIN — HEPARIN SODIUM 8000 UNITS: 1000 INJECTION INTRAVENOUS; SUBCUTANEOUS at 04:11

## 2018-11-07 RX ADMIN — EPINEPHRINE 0.04 MCG/KG/MIN: 1 INJECTION INTRAMUSCULAR; INTRAVENOUS; SUBCUTANEOUS at 07:11

## 2018-11-07 RX ADMIN — ROCURONIUM BROMIDE 40 MG: 10 INJECTION, SOLUTION INTRAVENOUS at 03:11

## 2018-11-07 RX ADMIN — ETOMIDATE 12 MG: 2 INJECTION, SOLUTION INTRAVENOUS at 03:11

## 2018-11-07 RX ADMIN — SODIUM CHLORIDE: 9 INJECTION, SOLUTION INTRAVENOUS at 03:11

## 2018-11-07 RX ADMIN — CEFAZOLIN 2 G: 330 INJECTION, POWDER, FOR SOLUTION INTRAMUSCULAR; INTRAVENOUS at 03:11

## 2018-11-07 RX ADMIN — PROPOFOL 30 MCG/KG/MIN: 10 INJECTION, EMULSION INTRAVENOUS at 07:11

## 2018-11-07 RX ADMIN — EPINEPHRINE 5 MCG: 0.1 INJECTION, SOLUTION ENDOTRACHEAL; INTRACARDIAC; INTRAVENOUS at 04:11

## 2018-11-07 RX ADMIN — ETOMIDATE 4 MG: 2 INJECTION, SOLUTION INTRAVENOUS at 03:11

## 2018-11-07 RX ADMIN — FENTANYL CITRATE 25 MCG: 50 INJECTION INTRAMUSCULAR; INTRAVENOUS at 09:11

## 2018-11-07 RX ADMIN — ROCURONIUM BROMIDE 20 MG: 10 INJECTION, SOLUTION INTRAVENOUS at 05:11

## 2018-11-07 RX ADMIN — ROCURONIUM BROMIDE 20 MG: 10 INJECTION, SOLUTION INTRAVENOUS at 04:11

## 2018-11-07 RX ADMIN — FENTANYL CITRATE 50 MCG: 50 INJECTION, SOLUTION INTRAMUSCULAR; INTRAVENOUS at 03:11

## 2018-11-07 RX ADMIN — MIDAZOLAM 2 MG: 1 INJECTION INTRAMUSCULAR; INTRAVENOUS at 02:11

## 2018-11-07 RX ADMIN — HEPARIN SODIUM AND DEXTROSE 3000 UNITS/HR: 10000; 5 INJECTION INTRAVENOUS at 04:11

## 2018-11-07 RX ADMIN — EPINEPHRINE 10 MCG: 0.1 INJECTION, SOLUTION ENDOTRACHEAL; INTRACARDIAC; INTRAVENOUS at 04:11

## 2018-11-07 RX ADMIN — FENTANYL CITRATE 50 MCG: 50 INJECTION, SOLUTION INTRAMUSCULAR; INTRAVENOUS at 07:11

## 2018-11-07 RX ADMIN — FUROSEMIDE 40 MG: 10 INJECTION, SOLUTION INTRAMUSCULAR; INTRAVENOUS at 02:11

## 2018-11-07 NOTE — Clinical Note
We did not achieve adequate pacing and sensing parameters with threshold testing;therefore lead was repositioned in coronary sinus

## 2018-11-07 NOTE — INTERVAL H&P NOTE
The patient has been examined and the H&P has been reviewed:    I concur with the findings and no changes have occurred since H&P was written.      We discussed the procedural details, risks and benefits of ablation with the patient.  In addition to the procedural details, we also discussed that while unlikely, catheter ablation is associated with several potential complications including, but not limited to, infection, bleeding , vascular complications, myocardial infarction, stroke, pericardial effusion/tamponade and, rarely, death. Patient appeared to understand the whole discussion and verbalized that all questions were answered to satisfaction. After deliberating over the options, explanation, and risks/ benefits of the ablation procedure, patient agreed to proceed with the ablation.            Active Hospital Problems    Diagnosis  POA    VT (ventricular tachycardia) [I47.2]  Yes     pleomorphic - multiple monomorphic         Resolved Hospital Problems   No resolved problems to display.       Echo with contrast  : 11/18  · Eccentric LVH with LVEF of < 20%. Inferior inferoseptal dyskinesis.  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   · Echo contrast given with no evidence of thrombus  · RV mildly dilated with Low normal RV systolic function  · Biatrial enlargement  · Indeterminant diastolic function with increased LAP  ·  AV with a mean gradient of 6mmHg and DOI of 0.43; there is mild perivalvular AI noted  · Dilated ascending aorta and aortic arch 5 to 5.5 cm  · +3 MR  · Mild TR with estimated PAP of about 50mmHg    Resting Score Index: 2.53             ·

## 2018-11-07 NOTE — ANESTHESIA PROCEDURE NOTES
Arterial    Diagnosis: Ventricular tachycardia  Doctor requesting consult: Dede    Patient location during procedure: done in OR  Procedure start time: 11/7/2018 3:30 PM  Procedure end time: 11/7/2018 3:36 PM  Staffing  Anesthesiologist: Sixto Gutierres MD  Performed: anesthesiologist   Anesthesiologist was present at the time of the procedure.  Preanesthetic Checklist  Completed: patient identified, site marked, surgical consent, pre-op evaluation, timeout performed, IV checked, risks and benefits discussed, monitors and equipment checked and anesthesia consent givenArterial  Skin Prep: chlorhexidine gluconate and isopropyl alcohol  Local Infiltration: none  Orientation: left  Location: radial  Catheter Size: 20 G  Catheter placement by Anatomical landmarks. Heme positive aspiration all ports.Insertion Attempts: 1  Assessment  Dressing: secured with tape and tegaderm  Patient: Tolerated well

## 2018-11-07 NOTE — Clinical Note
Defib pads used to defibrillate patient out of VT. One shock delivered at 200J. Patient shocked back into paced rhythm.

## 2018-11-07 NOTE — ANESTHESIA PREPROCEDURE EVALUATION
11/07/2018  Donavan Mathews is a 78 y.o., male with ventricular tachycardia here for EP study and possible ablation.    Pre-operative evaluation for Procedure(s) (LRB):  ABLATION, FOR VENTRICULAR TACHYCARDIA (N/A)        Patient Active Problem List   Diagnosis    VT (ventricular tachycardia)    S/P AVR (aortic valve replacement)    Hx of CABG    Current use of long term anticoagulation    Cardiomyopathy    A-fib       Review of patient's allergies indicates:   Allergen Reactions    Bactrim [sulfamethoxazole-trimethoprim]     Plasma human        No current facility-administered medications on file prior to encounter.      Current Outpatient Medications on File Prior to Encounter   Medication Sig Dispense Refill    amiodarone (PACERONE) 200 MG Tab Take 200 mg by mouth once daily.  6    ascorbic acid, vitamin C, (VITAMIN C) 1000 MG tablet Take 1,000 mg by mouth once daily.      atorvastatin (LIPITOR) 80 MG tablet Take 80 mg by mouth every evening.      carvedilol (COREG) 25 MG tablet Take 25 mg by mouth 2 (two) times daily.  6    cholecalciferol, vitamin D3, (VITAMIN D3) 2,000 unit Cap Take 1 capsule by mouth once daily.      coenzyme Q10 75 mg Cap Take 75 mg by mouth once daily.      digoxin (LANOXIN) 125 mcg tablet Take 125 mcg by mouth once daily.      diltiaZEM (CARDIZEM CD) 360 MG 24 hr capsule Take 360 mg by mouth once daily.  6    docusate sodium (COLACE) 100 MG capsule Take 100 mg by mouth 2 (two) times daily as needed for Constipation.      eplerenone (INSPRA) 25 MG Tab Take 25 mg by mouth once daily.  3    furosemide (LASIX) 40 MG tablet Take 40 mg by mouth once daily.  5    levothyroxine (TIROSINT) 50 mcg Cap Take 50 mcg by mouth once daily.      melatonin 5 mg Cap Take 5 mg by mouth every evening.      multivitamin (THERAGRAN) per tablet Take 1 tablet by mouth once daily.       paroxetine (PAXIL) 20 MG tablet Take 20 mg by mouth 2 (two) times daily. Pt taking .5 tablet twice daily  3    SAW PALMETTO ORAL Take by mouth 2 (two) times daily.      warfarin (COUMADIN) 2.5 MG tablet Take 2.5 mg by mouth.      HYDROcodone-acetaminophen (NORCO) 5-325 mg per tablet Take 1 tablet by mouth every 4 to 6 hours as needed.  0       Past Surgical History:   Procedure Laterality Date    AORTIC VALVE REPLACEMENT  1990       Social History     Socioeconomic History    Marital status:      Spouse name: Not on file    Number of children: Not on file    Years of education: Not on file    Highest education level: Not on file   Social Needs    Financial resource strain: Not on file    Food insecurity - worry: Not on file    Food insecurity - inability: Not on file    Transportation needs - medical: Not on file    Transportation needs - non-medical: Not on file   Occupational History    Not on file   Tobacco Use    Smoking status: Former Smoker    Smokeless tobacco: Former User   Substance and Sexual Activity    Alcohol use: No     Frequency: Never    Drug use: Not on file    Sexual activity: Not on file   Other Topics Concern    Not on file   Social History Narrative    Not on file         CBC: No results for input(s): WBC, RBC, HGB, HCT, PLT, MCV, MCH, MCHC in the last 72 hours.    CMP: No results for input(s): NA, K, CL, CO2, BUN, CREATININE, GLU, MG, PHOS, CALCIUM, ALBUMIN, PROT, ALKPHOS, ALT, AST, BILITOT in the last 72 hours.    INR  Recent Labs     11/07/18  1021   INR 3.0*               2D Echo:  No results found for this or any previous visit.      Anesthesia Evaluation    I have reviewed the Patient Summary Reports.    I have reviewed the Nursing Notes.   I have reviewed the Medications.     Review of Systems  Anesthesia Hx:  No problems with previous Anesthesia    Hematology/Oncology:  Hematology Normal   Oncology Normal     EENT/Dental:EENT/Dental Normal   Cardiovascular:    Pacemaker Hypertension CAD  CABG/stent Dysrhythmias  CHF Orthopnea    Pulmonary:   Shortness of breath    Renal/:  Renal/ Normal     Hepatic/GI:  Hepatic/GI Normal    Musculoskeletal:  Musculoskeletal Normal    Neurological:  Neurology Normal    Endocrine:   Hyperthyroidism    Dermatological:  Skin Normal    Psych:  Psychiatric Normal           Physical Exam  General:  Well nourished    Airway/Jaw/Neck:  Airway Findings: Mouth Opening: Normal Tongue: Normal  General Airway Assessment: Adult  Mallampati: II  Jaw/Neck Findings:  Neck ROM: Normal ROM     Eyes/Ears/Nose:  Eyes/Ears/Nose Findings:    Dental:  Dental Findings: In tact   Chest/Lungs:  Chest/Lungs Findings: Normal Respiratory Rate, Decreased Breath Sounds Bilateral  Subcutaneous device right chest     Heart/Vascular:  Heart Findings: Rate: Normal  Rhythm: Regular Rhythm  Sounds: Normal  Heart Murmur  Vascular Findings:        Mental Status:  Mental Status Findings:  Cooperative, Alert and Oriented         Anesthesia Plan  Type of Anesthesia, risks & benefits discussed:  Anesthesia Type:  general  Patient's Preference: General  Intra-op Monitoring Plan: standard ASA monitors and arterial line  Intra-op Monitoring Plan Comments:   Post Op Pain Control Plan:   Post Op Pain Control Plan Comments:   Induction:   IV  Beta Blocker:  Patient is on a Beta-Blocker and has received one dose within the past 24 hours (No further documentation required).       Informed Consent: Patient understands risks and agrees with Anesthesia plan.  Questions answered. Anesthesia consent signed with patient.  ASA Score: 4     Day of Surgery Review of History & Physical:    H&P update referred to the surgeon.     Anesthesia Plan Notes: Discussed plan for general endotracheal anesthesia and possibility of post operative intubation, pt and family understand and agree with plan        Ready For Surgery From Anesthesia Perspective.

## 2018-11-07 NOTE — Clinical Note
Generator Pocket opened at the right  upper chest  with blunt dissection, electrocautery, plasma blade and sharp dissection.

## 2018-11-07 NOTE — Clinical Note
Adequate sensing and pacing parameters achieved with threshold testing; good current of injury; no phrenic involvement

## 2018-11-08 ENCOUNTER — ANESTHESIA (OUTPATIENT)
Dept: MEDSURG UNIT | Facility: HOSPITAL | Age: 78
DRG: 273 | End: 2018-11-08
Payer: MEDICARE

## 2018-11-08 ENCOUNTER — ANESTHESIA EVENT (OUTPATIENT)
Dept: MEDSURG UNIT | Facility: HOSPITAL | Age: 78
DRG: 273 | End: 2018-11-08
Payer: MEDICARE

## 2018-11-08 LAB
ALBUMIN SERPL BCP-MCNC: 3 G/DL
ALP SERPL-CCNC: 91 U/L
ALT SERPL W/O P-5'-P-CCNC: 59 U/L
ANION GAP SERPL CALC-SCNC: 10 MMOL/L
ANION GAP SERPL CALC-SCNC: 10 MMOL/L
APTT BLDCRRT: 34.5 SEC
AST SERPL-CCNC: 95 U/L
BASOPHILS # BLD AUTO: 0.03 K/UL
BASOPHILS NFR BLD: 0.2 %
BILIRUB SERPL-MCNC: 0.9 MG/DL
BUN SERPL-MCNC: 19 MG/DL
BUN SERPL-MCNC: 21 MG/DL
CALCIUM SERPL-MCNC: 8.1 MG/DL
CALCIUM SERPL-MCNC: 8.4 MG/DL
CHLORIDE SERPL-SCNC: 102 MMOL/L
CHLORIDE SERPL-SCNC: 106 MMOL/L
CO2 SERPL-SCNC: 25 MMOL/L
CO2 SERPL-SCNC: 25 MMOL/L
CREAT SERPL-MCNC: 1 MG/DL
CREAT SERPL-MCNC: 1.1 MG/DL
DIFFERENTIAL METHOD: ABNORMAL
DIGOXIN SERPL-MCNC: 0.2 NG/ML
EOSINOPHIL # BLD AUTO: 0.1 K/UL
EOSINOPHIL NFR BLD: 0.4 %
ERYTHROCYTE [DISTWIDTH] IN BLOOD BY AUTOMATED COUNT: 14.8 %
EST. GFR  (AFRICAN AMERICAN): >60 ML/MIN/1.73 M^2
EST. GFR  (AFRICAN AMERICAN): >60 ML/MIN/1.73 M^2
EST. GFR  (NON AFRICAN AMERICAN): >60 ML/MIN/1.73 M^2
EST. GFR  (NON AFRICAN AMERICAN): >60 ML/MIN/1.73 M^2
GLUCOSE SERPL-MCNC: 108 MG/DL
GLUCOSE SERPL-MCNC: 116 MG/DL
HCT VFR BLD AUTO: 38.6 %
HGB BLD-MCNC: 13 G/DL
IMM GRANULOCYTES # BLD AUTO: 0.06 K/UL
IMM GRANULOCYTES NFR BLD AUTO: 0.5 %
INR PPP: 1.9
LYMPHOCYTES # BLD AUTO: 0.8 K/UL
LYMPHOCYTES NFR BLD: 6.1 %
MAGNESIUM SERPL-MCNC: 2.1 MG/DL
MAGNESIUM SERPL-MCNC: 2.4 MG/DL
MCH RBC QN AUTO: 31.2 PG
MCHC RBC AUTO-ENTMCNC: 33.7 G/DL
MCV RBC AUTO: 93 FL
MONOCYTES # BLD AUTO: 1.1 K/UL
MONOCYTES NFR BLD: 8.7 %
NEUTROPHILS # BLD AUTO: 10.5 K/UL
NEUTROPHILS NFR BLD: 84.1 %
NRBC BLD-RTO: 0 /100 WBC
PHOSPHATE SERPL-MCNC: 3.2 MG/DL
PLATELET # BLD AUTO: 140 K/UL
PMV BLD AUTO: 12.1 FL
POCT GLUCOSE: 103 MG/DL (ref 70–110)
POTASSIUM SERPL-SCNC: 3.6 MMOL/L
POTASSIUM SERPL-SCNC: 3.8 MMOL/L
PROT SERPL-MCNC: 6.7 G/DL
PROTHROMBIN TIME: 18.5 SEC
RBC # BLD AUTO: 4.17 M/UL
SODIUM SERPL-SCNC: 137 MMOL/L
SODIUM SERPL-SCNC: 141 MMOL/L
WBC # BLD AUTO: 12.53 K/UL

## 2018-11-08 PROCEDURE — 99900035 HC TECH TIME PER 15 MIN (STAT)

## 2018-11-08 PROCEDURE — 83735 ASSAY OF MAGNESIUM: CPT | Mod: 91

## 2018-11-08 PROCEDURE — 37000008 HC ANESTHESIA 1ST 15 MINUTES: Performed by: INTERNAL MEDICINE

## 2018-11-08 PROCEDURE — 84100 ASSAY OF PHOSPHORUS: CPT

## 2018-11-08 PROCEDURE — 33225 L VENTRIC PACING LEAD ADD-ON: CPT | Mod: 58,,, | Performed by: INTERNAL MEDICINE

## 2018-11-08 PROCEDURE — 80162 ASSAY OF DIGOXIN TOTAL: CPT

## 2018-11-08 PROCEDURE — 99233 SBSQ HOSP IP/OBS HIGH 50: CPT | Mod: GC,,, | Performed by: INTERNAL MEDICINE

## 2018-11-08 PROCEDURE — B51M1ZZ FLUOROSCOPY OF RIGHT UPPER EXTREMITY VEINS USING LOW OSMOLAR CONTRAST: ICD-10-PCS | Performed by: INTERNAL MEDICINE

## 2018-11-08 PROCEDURE — 20000000 HC ICU ROOM

## 2018-11-08 PROCEDURE — 0JWT0PZ REVISION OF CARDIAC RHYTHM RELATED DEVICE IN TRUNK SUBCUTANEOUS TISSUE AND FASCIA, OPEN APPROACH: ICD-10-PCS | Performed by: INTERNAL MEDICINE

## 2018-11-08 PROCEDURE — 85610 PROTHROMBIN TIME: CPT

## 2018-11-08 PROCEDURE — 85025 COMPLETE CBC W/AUTO DIFF WBC: CPT

## 2018-11-08 PROCEDURE — 43752 NASAL/OROGASTRIC W/TUBE PLMT: CPT

## 2018-11-08 PROCEDURE — 33222 RELOCATION POCKET PACEMAKER: CPT | Performed by: INTERNAL MEDICINE

## 2018-11-08 PROCEDURE — 63600175 PHARM REV CODE 636 W HCPCS: Performed by: STUDENT IN AN ORGANIZED HEALTH CARE EDUCATION/TRAINING PROGRAM

## 2018-11-08 PROCEDURE — 93010 ELECTROCARDIOGRAM REPORT: CPT | Mod: ,,, | Performed by: INTERNAL MEDICINE

## 2018-11-08 PROCEDURE — D9220A PRA ANESTHESIA: Mod: CRNA,,, | Performed by: NURSE ANESTHETIST, CERTIFIED REGISTERED

## 2018-11-08 PROCEDURE — 25000003 PHARM REV CODE 250: Performed by: NURSE ANESTHETIST, CERTIFIED REGISTERED

## 2018-11-08 PROCEDURE — 85730 THROMBOPLASTIN TIME PARTIAL: CPT

## 2018-11-08 PROCEDURE — 93005 ELECTROCARDIOGRAM TRACING: CPT

## 2018-11-08 PROCEDURE — C1894 INTRO/SHEATH, NON-LASER: HCPCS | Performed by: INTERNAL MEDICINE

## 2018-11-08 PROCEDURE — 25000003 PHARM REV CODE 250: Performed by: INTERNAL MEDICINE

## 2018-11-08 PROCEDURE — 33264 RMVL & RPLCMT DFB GEN MLT LD: CPT | Mod: 58,,, | Performed by: INTERNAL MEDICINE

## 2018-11-08 PROCEDURE — D9220A PRA ANESTHESIA: Mod: ANES,,, | Performed by: ANESTHESIOLOGY

## 2018-11-08 PROCEDURE — 80048 BASIC METABOLIC PNL TOTAL CA: CPT

## 2018-11-08 PROCEDURE — 94761 N-INVAS EAR/PLS OXIMETRY MLT: CPT

## 2018-11-08 PROCEDURE — 63600175 PHARM REV CODE 636 W HCPCS: Performed by: NURSE ANESTHETIST, CERTIFIED REGISTERED

## 2018-11-08 PROCEDURE — C1900 LEAD, CORONARY VENOUS: HCPCS | Performed by: INTERNAL MEDICINE

## 2018-11-08 PROCEDURE — 33264 RMVL & RPLCMT DFB GEN MLT LD: CPT | Performed by: INTERNAL MEDICINE

## 2018-11-08 PROCEDURE — 25500020 PHARM REV CODE 255: Performed by: INTERNAL MEDICINE

## 2018-11-08 PROCEDURE — C1730 CATH, EP, 19 OR FEW ELECT: HCPCS | Performed by: INTERNAL MEDICINE

## 2018-11-08 PROCEDURE — 37000009 HC ANESTHESIA EA ADD 15 MINS: Performed by: INTERNAL MEDICINE

## 2018-11-08 PROCEDURE — 02WA3MZ REVISION OF CARDIAC LEAD IN HEART, PERCUTANEOUS APPROACH: ICD-10-PCS | Performed by: INTERNAL MEDICINE

## 2018-11-08 PROCEDURE — 25000003 PHARM REV CODE 250: Performed by: STUDENT IN AN ORGANIZED HEALTH CARE EDUCATION/TRAINING PROGRAM

## 2018-11-08 PROCEDURE — 27200966 HC CLOSED SUCTION SYSTEM

## 2018-11-08 PROCEDURE — 33216 INSERT 1 ELECTRODE PM-DEFIB: CPT | Performed by: INTERNAL MEDICINE

## 2018-11-08 PROCEDURE — 25500020 PHARM REV CODE 255: Performed by: NURSE ANESTHETIST, CERTIFIED REGISTERED

## 2018-11-08 PROCEDURE — A4216 STERILE WATER/SALINE, 10 ML: HCPCS | Performed by: INTERNAL MEDICINE

## 2018-11-08 PROCEDURE — 27201423 OPTIME MED/SURG SUP & DEVICES STERILE SUPPLY: Performed by: INTERNAL MEDICINE

## 2018-11-08 PROCEDURE — C1725 CATH, TRANSLUMIN NON-LASER: HCPCS | Performed by: INTERNAL MEDICINE

## 2018-11-08 PROCEDURE — 83735 ASSAY OF MAGNESIUM: CPT

## 2018-11-08 PROCEDURE — C1769 GUIDE WIRE: HCPCS | Performed by: INTERNAL MEDICINE

## 2018-11-08 PROCEDURE — 80053 COMPREHEN METABOLIC PANEL: CPT

## 2018-11-08 DEVICE — IMPLANTABLE DEVICE: Type: IMPLANTABLE DEVICE | Site: CHEST | Status: FUNCTIONAL

## 2018-11-08 DEVICE — CAP 5867-3M STERILE INTL USA: Type: IMPLANTABLE DEVICE | Site: CHEST | Status: FUNCTIONAL

## 2018-11-08 RX ORDER — BUPIVACAINE HYDROCHLORIDE 2.5 MG/ML
INJECTION, SOLUTION EPIDURAL; INFILTRATION; INTRACAUDAL
Status: DISCONTINUED | OUTPATIENT
Start: 2018-11-08 | End: 2018-11-08 | Stop reason: HOSPADM

## 2018-11-08 RX ORDER — IODIXANOL 320 MG/ML
INJECTION, SOLUTION INTRAVASCULAR
Status: DISCONTINUED | OUTPATIENT
Start: 2018-11-08 | End: 2018-11-08 | Stop reason: HOSPADM

## 2018-11-08 RX ORDER — MORPHINE SULFATE 4 MG/ML
1 INJECTION, SOLUTION INTRAMUSCULAR; INTRAVENOUS ONCE
Status: COMPLETED | OUTPATIENT
Start: 2018-11-08 | End: 2018-11-08

## 2018-11-08 RX ORDER — PROPOFOL 10 MG/ML
5 INJECTION, EMULSION INTRAVENOUS CONTINUOUS
Status: DISCONTINUED | OUTPATIENT
Start: 2018-11-08 | End: 2018-11-09

## 2018-11-08 RX ORDER — SODIUM CHLORIDE 9 MG/ML
INJECTION, SOLUTION INTRAVENOUS CONTINUOUS
Status: DISCONTINUED | OUTPATIENT
Start: 2018-11-08 | End: 2018-11-09

## 2018-11-08 RX ORDER — CEFAZOLIN SODIUM 1 G/3ML
INJECTION, POWDER, FOR SOLUTION INTRAMUSCULAR; INTRAVENOUS
Status: DISCONTINUED | OUTPATIENT
Start: 2018-11-08 | End: 2018-11-08

## 2018-11-08 RX ORDER — PHENYLEPHRINE HYDROCHLORIDE 10 MG/ML
INJECTION INTRAVENOUS
Status: DISCONTINUED | OUTPATIENT
Start: 2018-11-08 | End: 2018-11-08

## 2018-11-08 RX ORDER — SODIUM CHLORIDE 9 MG/ML
INJECTION, SOLUTION INTRAMUSCULAR; INTRAVENOUS; SUBCUTANEOUS
Status: DISCONTINUED | OUTPATIENT
Start: 2018-11-08 | End: 2018-11-08 | Stop reason: HOSPADM

## 2018-11-08 RX ORDER — DEXMEDETOMIDINE HYDROCHLORIDE 4 UG/ML
0.2 INJECTION, SOLUTION INTRAVENOUS CONTINUOUS
Status: DISCONTINUED | OUTPATIENT
Start: 2018-11-08 | End: 2018-11-09

## 2018-11-08 RX ORDER — POTASSIUM CHLORIDE 20 MEQ/15ML
40 SOLUTION ORAL ONCE
Status: DISCONTINUED | OUTPATIENT
Start: 2018-11-08 | End: 2018-11-13 | Stop reason: HOSPADM

## 2018-11-08 RX ORDER — EPINEPHRINE 0.1 MG/ML
INJECTION INTRAVENOUS
Status: DISCONTINUED | OUTPATIENT
Start: 2018-11-08 | End: 2018-11-08

## 2018-11-08 RX ORDER — WARFARIN 2.5 MG/1
2.5 TABLET ORAL DAILY
Status: DISCONTINUED | OUTPATIENT
Start: 2018-11-08 | End: 2018-11-12

## 2018-11-08 RX ORDER — ATORVASTATIN CALCIUM 20 MG/1
80 TABLET, FILM COATED ORAL NIGHTLY
Status: DISCONTINUED | OUTPATIENT
Start: 2018-11-08 | End: 2018-11-13 | Stop reason: HOSPADM

## 2018-11-08 RX ORDER — POTASSIUM CHLORIDE 20 MEQ/1
40 TABLET, EXTENDED RELEASE ORAL ONCE
Status: DISCONTINUED | OUTPATIENT
Start: 2018-11-08 | End: 2018-11-08 | Stop reason: SDUPTHER

## 2018-11-08 RX ORDER — VANCOMYCIN HCL IN 5 % DEXTROSE 1G/250ML
1000 PLASTIC BAG, INJECTION (ML) INTRAVENOUS ONCE
Status: CANCELLED | OUTPATIENT
Start: 2018-11-08

## 2018-11-08 RX ORDER — POTASSIUM CHLORIDE 20 MEQ/1
60 TABLET, EXTENDED RELEASE ORAL ONCE
Status: DISCONTINUED | OUTPATIENT
Start: 2018-11-08 | End: 2018-11-08 | Stop reason: SDUPTHER

## 2018-11-08 RX ORDER — IODIXANOL 320 MG/ML
INJECTION, SOLUTION INTRAVASCULAR
Status: DISCONTINUED | OUTPATIENT
Start: 2018-11-08 | End: 2018-11-08

## 2018-11-08 RX ORDER — LIDOCAINE HYDROCHLORIDE 20 MG/ML
INJECTION, SOLUTION INFILTRATION; PERINEURAL
Status: DISCONTINUED | OUTPATIENT
Start: 2018-11-08 | End: 2018-11-08 | Stop reason: HOSPADM

## 2018-11-08 RX ADMIN — ATORVASTATIN CALCIUM 80 MG: 20 TABLET, FILM COATED ORAL at 05:11

## 2018-11-08 RX ADMIN — PROPOFOL 45 MCG/KG/MIN: 10 INJECTION, EMULSION INTRAVENOUS at 05:11

## 2018-11-08 RX ADMIN — SODIUM CHLORIDE: 0.9 INJECTION, SOLUTION INTRAVENOUS at 01:11

## 2018-11-08 RX ADMIN — FUROSEMIDE 40 MG: 40 TABLET ORAL at 04:11

## 2018-11-08 RX ADMIN — EPINEPHRINE 10 MCG: 0.1 INJECTION, SOLUTION ENDOTRACHEAL; INTRACARDIAC; INTRAVENOUS at 05:11

## 2018-11-08 RX ADMIN — LEVOTHYROXINE SODIUM 75 MCG: 75 TABLET ORAL at 04:11

## 2018-11-08 RX ADMIN — EPLERENONE 25 MG: 25 TABLET ORAL at 04:11

## 2018-11-08 RX ADMIN — PHENYLEPHRINE HYDROCHLORIDE 100 MCG: 10 INJECTION INTRAVENOUS at 06:11

## 2018-11-08 RX ADMIN — CEFAZOLIN 2 G: 330 INJECTION, POWDER, FOR SOLUTION INTRAMUSCULAR; INTRAVENOUS at 06:11

## 2018-11-08 RX ADMIN — EPINEPHRINE 0.02 MCG/KG/MIN: 1 INJECTION INTRAMUSCULAR; INTRAVENOUS; SUBCUTANEOUS at 01:11

## 2018-11-08 RX ADMIN — SODIUM CHLORIDE, SODIUM GLUCONATE, SODIUM ACETATE, POTASSIUM CHLORIDE, MAGNESIUM CHLORIDE, SODIUM PHOSPHATE, DIBASIC, AND POTASSIUM PHOSPHATE: .53; .5; .37; .037; .03; .012; .00082 INJECTION, SOLUTION INTRAVENOUS at 05:11

## 2018-11-08 RX ADMIN — PROPOFOL 45 MCG/KG/MIN: 10 INJECTION, EMULSION INTRAVENOUS at 11:11

## 2018-11-08 RX ADMIN — PAROXETINE HYDROCHLORIDE 20 MG: 20 TABLET, FILM COATED ORAL at 04:11

## 2018-11-08 RX ADMIN — PHENYLEPHRINE HYDROCHLORIDE 100 MCG: 10 INJECTION INTRAVENOUS at 05:11

## 2018-11-08 RX ADMIN — EPINEPHRINE 0.02 MCG/KG/MIN: 1 INJECTION INTRAMUSCULAR; INTRAVENOUS; SUBCUTANEOUS at 05:11

## 2018-11-08 RX ADMIN — DEXMEDETOMIDINE HYDROCHLORIDE 1 MCG/KG/HR: 100 INJECTION, SOLUTION, CONCENTRATE INTRAVENOUS at 04:11

## 2018-11-08 RX ADMIN — IODIXANOL 10 ML: 320 INJECTION, SOLUTION INTRAVASCULAR at 05:11

## 2018-11-08 RX ADMIN — MORPHINE SULFATE 1 MG: 4 INJECTION, SOLUTION INTRAMUSCULAR; INTRAVENOUS at 10:11

## 2018-11-08 RX ADMIN — PROPOFOL 40 MCG/KG/MIN: 10 INJECTION, EMULSION INTRAVENOUS at 01:11

## 2018-11-08 RX ADMIN — WARFARIN SODIUM 2.5 MG: 2.5 TABLET ORAL at 04:11

## 2018-11-08 RX ADMIN — DIGOXIN 125 MCG: 125 TABLET ORAL at 04:11

## 2018-11-08 RX ADMIN — AMIODARONE HYDROCHLORIDE 200 MG: 200 TABLET ORAL at 01:11

## 2018-11-08 RX ADMIN — DEXMEDETOMIDINE HYDROCHLORIDE 0.2 MCG/KG/HR: 100 INJECTION, SOLUTION, CONCENTRATE INTRAVENOUS at 10:11

## 2018-11-08 RX ADMIN — AMIODARONE HYDROCHLORIDE 200 MG: 200 TABLET ORAL at 04:11

## 2018-11-08 NOTE — ASSESSMENT & PLAN NOTE
HR < 110 now  -Home warfarin (also for mechanical AVR), dig, amio  -Holding BB and dilt for epi drip

## 2018-11-08 NOTE — PROGRESS NOTES
Patient received post op in PACU intubated with a 7.5 ETT marked at 22 at the lip. Tube is secured and patient is on mechanical ventilation at this time. Will continue to monitor.

## 2018-11-08 NOTE — PROGRESS NOTES
[facility]  Cardiac Electrophysiology  Progress Note    Admission Date: 11/7/2018  Code Status: No Order   Attending Physician: Vamsi Garcia MD   Expected Discharge Date: 11/12/2018  Principal Problem:<principal problem not specified>    Subjective:     Interval History: No events over night. Telemetry: HR 70 V-P    Review of Systems   Unable to perform ROS: intubated     Objective:     Vital Signs (Most Recent):  Temp: 97.1 °F (36.2 °C) (11/08/18 0700)  Pulse: 69 (11/08/18 0916)  Resp: 18 (11/08/18 0916)  BP: 117/75 (11/08/18 0800)  SpO2: 95 % (11/08/18 0916) Vital Signs (24h Range):  Temp:  [96 °F (35.6 °C)-98 °F (36.7 °C)] 97.1 °F (36.2 °C)  Pulse:  [68-71] 69  Resp:  [12-63] 18  SpO2:  [91 %-100 %] 95 %  BP: ()/(57-76) 117/75  Arterial Line BP: ()/(52-67) 130/67     Weight: 76.7 kg (169 lb)  Body mass index is 22.92 kg/m².     SpO2: 95 %  O2 Device (Oxygen Therapy): ventilator    Physical Exam   Constitutional: He is oriented to person, place, and time. He appears well-developed and well-nourished.   HENT:   Head: Normocephalic and atraumatic.   Nose: Nose normal.   Mouth/Throat: No oropharyngeal exudate.   Eyes: Right eye exhibits no discharge. Left eye exhibits no discharge. No scleral icterus.   Neck: Normal range of motion. Neck supple. No JVD present.   Cardiovascular: Normal rate, regular rhythm, S1 normal and S2 normal. Exam reveals no gallop, no S3, no S4, no distant heart sounds, no friction rub, no midsystolic click and no opening snap.   No murmur heard.  Pulses:       Radial pulses are 2+ on the right side, and 2+ on the left side.        Femoral pulses are 2+ on the right side, and 2+ on the left side.  Pulmonary/Chest: Effort normal and breath sounds normal. No respiratory distress. He has no wheezes. He has no rales. He exhibits no tenderness.   Abdominal: Soft. Bowel sounds are normal. He exhibits no distension. There is no tenderness. There is no rebound.    Musculoskeletal: Normal range of motion. He exhibits no edema, tenderness or deformity.   Lymphadenopathy:     He has no cervical adenopathy.   Neurological: He is alert and oriented to person, place, and time. No cranial nerve deficit.   Skin: Skin is warm and dry.   Psychiatric: He has a normal mood and affect. His behavior is normal.       Significant Labs:   Blood Culture: No results for input(s): LABBLOO in the last 48 hours., BMP:   Recent Labs   Lab 11/08/18  0303         K 3.6      CO2 25   BUN 21   CREATININE 1.1   CALCIUM 8.1*   MG 2.4   , CMP:   Recent Labs   Lab 11/08/18  0303      K 3.6      CO2 25      BUN 21   CREATININE 1.1   CALCIUM 8.1*   PROT 6.7   ALBUMIN 3.0*   BILITOT 0.9   ALKPHOS 91   AST 95*   ALT 59*   ANIONGAP 10   ESTGFRAFRICA >60.0   EGFRNONAA >60.0    and CBC:   Recent Labs   Lab 11/08/18  0303   WBC 12.53   HGB 13.0*   HCT 38.6*   *       Significant Imaging: Telemetry, TTE, XRAys    Assessment and Plan:     VT (ventricular tachycardia)    78 yr old man with PMH of persistent Afib, Mixed CM Ef 25% s/p Medtronic CRTD upgrade 1/17, S/p  Mechanical AVR in 1990, s/p 1v CABG(SVG to RCA) 1990,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     Post procedure Xray w/o pneumothorax (abdominal Xray with extension into chest)  Consent for LV lead revision left on chart     F/U EP recs:  -Continue dig, amiodarone  -Hold dilt and BB  -NPO for LV lead revision and venogram in the AM  -Continue weaning epi as tolerated  -Continue intubation/sedation for now, likely till after the EP procedures unless otherwise indicated.         Zachariah Orellana MD  Cardiac Electrophysiology  [facility]
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
## 2018-11-08 NOTE — PROGRESS NOTES
11/08/18 0700   Vital Signs   Pulse 69   Heart Rate Source Monitor   Resp 14   Resp Rate Total 15 br/min   SpO2 100 %   Pulse Oximetry Type Continuous   Oxygen Concentration (%) 40   O2 Device (Oxygen Therapy) ventilator   patient TV: [phone]

## 2018-11-08 NOTE — PHARMACY MED REC
"Admission Medication Reconciliation - Pharmacy Consult Note    The home medication history was taken by Nimo Garrison PharmD  Based on information gathered and subsequent review by the clinical pharmacist, the items below may need attention.    You may go to "Admission" then "Reconcile Home Medications" tabs to review and/or act upon these items.        No issues noted with the medication reconciliation. Patient takes coumadin 2.5mg daily with none on Sunday.  [facility]           Please address this information as you see fit.  Feel free to contact us if you have any questions or require assistance.    Nimo Garrison PharmD  [id number]              .    .          "

## 2018-11-08 NOTE — PLAN OF CARE
11/08/18 1616   Discharge Assessment   Assessment Type Discharge Planning Assessment   Confirmed/corrected address and phone number on facesheet? Yes   Assessment information obtained from? Medical Record   Expected Length of Stay (days) 4   Prior to hospitilization cognitive status: Alert/Oriented   Prior to hospitalization functional status: Independent   Current cognitive status: Coma/Sedated/Intubated   Current Functional Status: Partially Dependent   Facility Arrived From: home for planned EP procedure   Lives With spouse   Able to Return to Prior Arrangements yes   Is patient able to care for self after discharge? Yes   Who are your caregiver(s) and their phone number(s)? spouse Madison Mathews [phone]   Readmission Within The Last 30 Days no previous admission in last 30 days   Patient currently being followed by outpatient case management? No   Patient currently receives any other outside agency services? No   Equipment Currently Used at Home none   Do you have any problems affording any of your prescribed medications? No   Is the patient taking medications as prescribed? yes   Does the patient have transportation home? Yes   Transportation Available car;family or friend will provide   Does the patient receive services at the Coumadin Clinic? No   Discharge Plan A Home with family   Discharge Plan B Home with family;Home Health   Patient/Family In Agreement With Plan unable to assess  (pt remains intubated and sedated)

## 2018-11-08 NOTE — TRANSFER OF CARE
Anesthesia Transfer of Care Note    Patient: Donavan Mathews    Procedure(s) Performed: Procedure(s) (LRB):  ABLATION, FOR VENTRICULAR TACHYCARDIA (N/A)  Cardioversion or Defibrillation    Patient location: PACU    Anesthesia Type: general    Transport from OR: Transported from OR intubated on 100% O2 by AMBU with adequate controlled ventilation. Continuos invasive BP monitoring in transport. Continuous ECG monitoring in transport. Continuous SpO2 monitoring in transport. Upon arrival to PACU/ICU, patient attached to ventilator and auscultated to confirm bilateral breath sounds and adequate TV    Post pain: adequate analgesia    Post assessment: no apparent anesthetic complications and tolerated procedure well    Post vital signs: stable    Level of consciousness: sedated    Nausea/Vomiting: no vomiting    Complications: none    Transfer of care protocol was followed      Last vitals:   Visit Vitals  /72 (BP Location: Right arm, Patient Position: Lying)   Pulse 69   Temp 35.6 °C (96 °F) (Oral)   Resp (!) 56   Ht 6' (1.829 m)   Wt 76.7 kg (169 lb)   SpO2 96%   BMI 22.92 kg/m²

## 2018-11-08 NOTE — ANESTHESIA PREPROCEDURE EVALUATION
11/08/2018  Donavan Mathews is a 78 y.o., male.  Pre-operative evaluation for Procedure(s) (LRB):  REVISION, INSERTION, ELECTRODE LEAD, ICD (Right)    Donavan Mathews is a 78 y.o. 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 presents for above procedure        LDA:   R 18G  L 18G L AC  20G L hand    Prev airway: Easy Mask  Jansen 2  7.5 ETT  Grade I  1 attempt    Drips:   Precedex @ 1  Propofol @ 15mcg  NS @ 50cc/hr  Patient Active Problem List   Diagnosis    VT (ventricular tachycardia)    S/P AVR (aortic valve replacement)    Hx of CABG    Current use of long term anticoagulation    Cardiomyopathy    A-fib       Review of patient's allergies indicates:   Allergen Reactions    Bactrim [sulfamethoxazole-trimethoprim]     Plasma human         No current facility-administered medications on file prior to encounter.      Current Outpatient Medications on File Prior to Encounter   Medication Sig Dispense Refill    amiodarone (PACERONE) 200 MG Tab Take 200 mg by mouth once daily.  6    ascorbic acid, vitamin C, (VITAMIN C) 1000 MG tablet Take 1,000 mg by mouth once daily.      atorvastatin (LIPITOR) 80 MG tablet Take 80 mg by mouth every evening.      carvedilol (COREG) 25 MG tablet Take 25 mg by mouth 2 (two) times daily.  6    cholecalciferol, vitamin D3, (VITAMIN D3) 2,000 unit Cap Take 1 capsule by mouth once daily.      coenzyme Q10 75 mg Cap Take 75 mg by mouth once daily.      digoxin (LANOXIN) 125 mcg tablet Take 125 mcg by mouth once daily.      diltiaZEM (CARDIZEM CD) 360 MG 24 hr capsule Take 360 mg by mouth once daily.  6    docusate sodium (COLACE) 100 MG capsule Take 100  mg by mouth 2 (two) times daily as needed for Constipation.      eplerenone (INSPRA) 25 MG Tab Take 25 mg by mouth once daily.  3    furosemide (LASIX) 40 MG tablet Take 40 mg by mouth once daily.  5    levothyroxine (TIROSINT) 50 mcg Cap Take 50 mcg by mouth once daily.      melatonin 5 mg Cap Take 5 mg by mouth every evening.      multivitamin (THERAGRAN) per tablet Take 1 tablet by mouth once daily.      paroxetine (PAXIL) 20 MG tablet Take 20 mg by mouth 2 (two) times daily. Pt taking .5 tablet twice daily  3    SAW PALMETTO ORAL Take by mouth 2 (two) times daily.      warfarin (COUMADIN) 2.5 MG tablet Take 2.5 mg by mouth.      HYDROcodone-acetaminophen (NORCO) 5-325 mg per tablet Take 1 tablet by mouth every 4 to 6 hours as needed.  0       Past Surgical History:   Procedure Laterality Date    AORTIC VALVE REPLACEMENT  1990       Social History     Socioeconomic History    Marital status:      Spouse name: Not on file    Number of children: Not on file    Years of education: Not on file    Highest education level: Not on file   Social Needs    Financial resource strain: Not on file    Food insecurity - worry: Not on file    Food insecurity - inability: Not on file    Transportation needs - medical: Not on file    Transportation needs - non-medical: Not on file   Occupational History    Not on file   Tobacco Use    Smoking status: Former Smoker    Smokeless tobacco: Former User   Substance and Sexual Activity    Alcohol use: No     Frequency: Never    Drug use: Not on file    Sexual activity: Not on file   Other Topics Concern    Not on file   Social History Narrative    Not on file         Vital Signs Range (Last 24H):  Temp:  [35.9 °C (96.6 °F)-36.7 °C (98 °F)]   Pulse:  [68-71]   Resp:  [12-63]   BP: ()/(57-75)   SpO2:  [95 %-100 %]   Arterial Line BP: ()/(52-67)       CBC:   Recent Labs     11/08/18  0303   WBC 12.53   RBC 4.17*   HGB 13.0*   HCT 38.6*   PLT  140*   MCV 93   MCH 31.2*   MCHC 33.7       CMP:   Recent Labs     11/08/18  0303      K 3.6      CO2 25   BUN 21   CREATININE 1.1      MG 2.4   PHOS 3.2   CALCIUM 8.1*   ALBUMIN 3.0*   PROT 6.7   ALKPHOS 91   ALT 59*   AST 95*   BILITOT 0.9       INR  Recent Labs     11/07/18  1021 11/08/18  0303 11/08/18  1205   INR 3.0* 1.9*  --    APTT  --   --  34.5*           Diagnostic Studies:      EKG:  Vent. Rate : 054 BPM     Atrial Rate : 082 BPM     P-R Int : 000 ms          QRS Dur : 184 ms      QT Int : 504 ms       P-R-T Axes : 000 065 263 degrees     QTc Int : 477 ms    Atrial fibrillation with slow ventricular response  LBBB, complete  Abnormal ECG  When compared with ECG of 07-NOV-2018 10:26,  ventricular paced rhythm is no longer present  Vent. rate has decreased BY  28 BPM  Confirmed by Dede AZAR, Bianca ([id number]) on 11/8/2018 2:14:34 PM    2D Echo: 11/7/18  · Eccentric LVH with LVEF of < 20%. There are WMA as delineated in the diagram. 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 Echo contrast given with no evidence of thrombus  · RV mildly dilated with Low normal RV systolic function  · Biatrial enlargement  · Indeterminant diastolic function with increased LAP  ·  AV with a mean gradient of 6mmHg and DOI of 0.43; there is mild perivalvular AI noted  · Dilated ascending aorta and aortic arch 5 to 5.5 cm  · +3 MR  · Mild TR with estimated PAP of about 50mmHg  · Moderately elevated CVP of 8mmHg  · RIght Pleural effusion      Anesthesia Evaluation    I have reviewed the Patient Summary Reports.     I have reviewed the Medications.     Review of Systems  Anesthesia Hx:  No problems with previous Anesthesia  History of prior surgery of interest to airway management or planning: heart surgery. Previous anesthesia: General  Denies Personal Hx of Anesthesia complications.   Social:  Former Smoker, Social Alcohol Use    Hematology/Oncology:  Hematology Normal   Oncology  Normal     EENT/Dental:EENT/Dental Normal   Cardiovascular:   Exercise tolerance: poor Valvular problems/Murmurs    Pulmonary:  Pulmonary Normal    Renal/:  Renal/ Normal     Hepatic/GI:  Hepatic/GI Normal    Musculoskeletal:  Musculoskeletal Normal    Neurological:  Neurology Normal    Endocrine:   Hyperthyroidism    Dermatological:  Skin Normal    Psych:  Psychiatric Normal           Physical Exam  General:  Well nourished    Airway/Jaw/Neck:  Airway Findings: Mouth Opening: Normal Tongue: Normal  General Airway Assessment: Adult  Mallampati: II  TM Distance: Normal, at least 6 cm  Jaw/Neck Findings:  Neck ROM: Normal ROM      Dental:  Dental Findings: In tact        Mental Status:  Mental Status Findings:  Cooperative, Alert and Oriented         Anesthesia Plan  Type of Anesthesia, risks & benefits discussed:  Anesthesia Type:  general  Patient's Preference: GA  Intra-op Monitoring Plan: standard ASA monitors  Intra-op Monitoring Plan Comments:   Post Op Pain Control Plan: multimodal analgesia  Post Op Pain Control Plan Comments:   Induction:   IV  Beta Blocker:  Patient is on a Beta-Blocker and has received one dose within the past 24 hours (No further documentation required).       Informed Consent: Patient understands risks and agrees with Anesthesia plan.  Questions answered. Anesthesia consent signed with patient.  ASA Score: 4     Day of Surgery Review of History & Physical:  There are no significant changes.  H&P update referred to the provider.         Ready For Surgery From Anesthesia Perspective.

## 2018-11-08 NOTE — SUBJECTIVE & OBJECTIVE
Past Medical History:   Diagnosis Date    A-fib     persistant     Cardiomyopathy 1990    mixed - s/p CRTD 1/17 medtronic     Current use of long term anticoagulation     Hx of CABG 1990    Hyperthyroidism     Restrictive lung disease     S/P AVR (aortic valve replacement) 1990    mechanical on warfarin    VT (ventricular tachycardia)     pleomorphic - multiple monomorphic        Past Surgical History:   Procedure Laterality Date    AORTIC VALVE REPLACEMENT  1990       Review of patient's allergies indicates:   Allergen Reactions    Bactrim [sulfamethoxazole-trimethoprim]     Plasma human        No current facility-administered medications on file prior to encounter.      Current Outpatient Medications on File Prior to Encounter   Medication Sig    amiodarone (PACERONE) 200 MG Tab Take 200 mg by mouth once daily.    ascorbic acid, vitamin C, (VITAMIN C) 1000 MG tablet Take 1,000 mg by mouth once daily.    atorvastatin (LIPITOR) 80 MG tablet Take 80 mg by mouth every evening.    carvedilol (COREG) 25 MG tablet Take 25 mg by mouth 2 (two) times daily.    cholecalciferol, vitamin D3, (VITAMIN D3) 2,000 unit Cap Take 1 capsule by mouth once daily.    coenzyme Q10 75 mg Cap Take 75 mg by mouth once daily.    digoxin (LANOXIN) 125 mcg tablet Take 125 mcg by mouth once daily.    diltiaZEM (CARDIZEM CD) 360 MG 24 hr capsule Take 360 mg by mouth once daily.    docusate sodium (COLACE) 100 MG capsule Take 100 mg by mouth 2 (two) times daily as needed for Constipation.    eplerenone (INSPRA) 25 MG Tab Take 25 mg by mouth once daily.    furosemide (LASIX) 40 MG tablet Take 40 mg by mouth once daily.    levothyroxine (TIROSINT) 50 mcg Cap Take 50 mcg by mouth once daily.    melatonin 5 mg Cap Take 5 mg by mouth every evening.    multivitamin (THERAGRAN) per tablet Take 1 tablet by mouth once daily.    paroxetine (PAXIL) 20 MG tablet Take 20 mg by mouth 2 (two) times daily. Pt taking .5 tablet twice  daily    SAW PALMETTO ORAL Take by mouth 2 (two) times daily.    warfarin (COUMADIN) 2.5 MG tablet Take 2.5 mg by mouth.    HYDROcodone-acetaminophen (NORCO) 5-325 mg per tablet Take 1 tablet by mouth every 4 to 6 hours as needed.     Family History     None        Tobacco Use    Smoking status: Former Smoker    Smokeless tobacco: Former User   Substance and Sexual Activity    Alcohol use: No     Frequency: Never    Drug use: Not on file    Sexual activity: Not on file     Review of Systems   Unable to perform ROS: intubated     Objective:     Vital Signs (Most Recent):  Temp: 97.8 °F (36.6 °C) (11/07/18 2300)  Pulse: 69 (11/08/18 0200)  Resp: (!) 63 (11/08/18 0200)  BP: 99/60 (11/08/18 0100)  SpO2: 95 % (11/08/18 0200) Vital Signs (24h Range):  Temp:  [96 °F (35.6 °C)-98 °F (36.7 °C)] 97.8 °F (36.6 °C)  Pulse:  [68-71] 69  Resp:  [12-63] 63  SpO2:  [91 %-100 %] 95 %  BP: ()/(57-76) 99/60  Arterial Line BP: ()/(52-63) 91/54     Weight: 76.7 kg (169 lb)  Body mass index is 22.92 kg/m².    SpO2: 95 %  O2 Device (Oxygen Therapy): ventilator      Intake/Output Summary (Last 24 hours) at 11/8/2018 0236  Last data filed at 11/8/2018 0200  Gross per 24 hour   Intake 982.5 ml   Output 1055 ml   Net -72.5 ml       Lines/Drains/Airways     Drain                 Urethral Catheter 11/07/18 1945 less than 1 day          Airway                 Airway - Non-Surgical 11/07/18 1519 Endotracheal Tube less than 1 day          Arterial Line                 Arterial Line 11/07/18 1533 Left Radial less than 1 day          Peripheral Intravenous Line                 Peripheral IV - Single Lumen 11/07/18 Right Forearm 1 day         Peripheral IV - Single Lumen 11/07/18 1541 Left Antecubital less than 1 day         Peripheral IV - Single Lumen 11/07/18 1541 Left Wrist less than 1 day                Physical Exam   Constitutional: He is oriented to person, place, and time. He appears well-developed and well-nourished.    HENT:   Head: Normocephalic and atraumatic.   Nose: Nose normal.   Mouth/Throat: No oropharyngeal exudate.   Eyes: Right eye exhibits no discharge. Left eye exhibits no discharge. No scleral icterus.   Neck: Normal range of motion. Neck supple. No JVD present.   Cardiovascular: Normal rate, regular rhythm, S1 normal and S2 normal. Exam reveals no gallop, no S3, no S4, no distant heart sounds, no friction rub, no midsystolic click and no opening snap.   No murmur heard.  Pulses:       Radial pulses are 2+ on the right side, and 2+ on the left side.        Femoral pulses are 2+ on the right side, and 2+ on the left side.  Pulmonary/Chest: No respiratory distress. He has no wheezes. He has no rales. He exhibits no tenderness.   Intubated/sedated   Abdominal: Soft. Bowel sounds are normal. He exhibits no distension. There is no tenderness. There is no rebound.   Musculoskeletal: Normal range of motion. He exhibits no edema, tenderness or deformity.   Lymphadenopathy:     He has no cervical adenopathy.   Neurological: He is alert and oriented to person, place, and time. No cranial nerve deficit.   Skin: Skin is warm and dry.       Significant Labs: BMP: No results for input(s): GLU, NA, K, CL, CO2, BUN, CREATININE, CALCIUM, MG in the last 48 hours., CMP No results for input(s): NA, K, CL, CO2, GLU, BUN, CREATININE, CALCIUM, PROT, ALBUMIN, BILITOT, ALKPHOS, AST, ALT, ANIONGAP, ESTGFRAFRICA, EGFRNONAA in the last 48 hours. and CBC No results for input(s): WBC, HGB, HCT, PLT in the last 48 hours.    Significant Imaging: Telemetry, EKGs, TTE

## 2018-11-08 NOTE — ASSESSMENT & PLAN NOTE
Mixed CMP <20% s/p CRT-D  Global hypokinesis worse in inferior and inferoseptal walls  Mildly dilated LV and RV  -Holding BB whilst on epi  -May need ACE-I, aldosterone inhibitor, maybe entresto -will need to readdress when patient able to provide history and off epinephrine  -Lasix increased from 40 PO daily to BID by EP fellow due to pulm edema after lasix 40 IV once earlier.

## 2018-11-08 NOTE — PROGRESS NOTES
K: 3.6 this am. Not replaced d/t OGT needing to be replaced & verified. 40mEq Potassium Chloride 10% solution per OGT. readback x2. Orders placed

## 2018-11-08 NOTE — ANESTHESIA POSTPROCEDURE EVALUATION
Anesthesia Post Evaluation    Patient: Donavan Mathews    Procedure(s) Performed: Procedure(s) (LRB):  ABLATION, FOR VENTRICULAR TACHYCARDIA (N/A)  Cardioversion or Defibrillation    Final Anesthesia Type: general  Patient location during evaluation: ICU  Patient participation: No - Unable to Participate, Intubation  Level of consciousness: sedated  Post-procedure vital signs: reviewed and stable  Pain management: adequate  Airway patency: patent  PONV status at discharge: No PONV  Anesthetic complications: no      Cardiovascular status: hemodynamically stable  Respiratory status: ETT, intubated and ventilator  Hydration status: euvolemic  Follow-up not needed.        Visit Vitals  /66   Pulse 69   Temp 36.2 °C (97.1 °F) (Axillary)   Resp 18   Ht 6' (1.829 m)   Wt 76.7 kg (169 lb)   SpO2 97%   BMI 22.92 kg/m²       Pain/Neftaly Score: Pain Assessment Performed: Yes (11/8/2018  9:00 AM)  Presence of Pain: denies (11/8/2018  9:00 AM)  Pain Rating Prior to Med Admin: 8 (11/7/2018  9:15 PM)

## 2018-11-08 NOTE — PROGRESS NOTES
Received patient from PACU intubated with  a 7.5 ETT marked at 22 at the lip. Mechanical ventilation resume with settings as ordered

## 2018-11-08 NOTE — BRIEF OP NOTE
Patient is s/p successful Inferoseptal VT ablation:   LV lead seems to be in great cardiac vein - septal location and not lateral.   Right sided Medtronic CRTD.   He was volume overloaded at the beginning of the case and hence he was diuresed with lasix 40 and then sedated and intubated for the procedure.   He is on epinephrine at this time. Will wean overnight as tolerated.  Admit to CCU for monitoring overnght.  Cont amiodarone   Of note he was on metop and diltiazem(despite HF- sa noted from OSH notes -only after diltiazem that his VT terminated at OSH in the past). Hold both diltiazem and metop for now while he is on epinephrine.  Post op care per protocol.  Anesthesia joel recover patient in recovery - to extubate in 4 hrs.   Bed flat for 4 hrs.  Hemostasis with sutures and safeguard.   F/u on INR in am (Afib and mech aortic valve)  KEEP NPO after midnight - for possible LV lead revision tomorrow.   Need a right peripheral IV tonight for venogram in am.     D/w Dr Aguayo  CCU fellow notified.

## 2018-11-08 NOTE — ASSESSMENT & PLAN NOTE
-Continue home atorvastatin 80  -Hold BB as is on epi now  -Will need to address need for ASA when able to talk to patient

## 2018-11-08 NOTE — PLAN OF CARE
Problem: Patient Care Overview  Goal: Plan of Care Review  Outcome: Ongoing (interventions implemented as appropriate)  Patient VSS, 100% paced @ a rate 69 BPM. Bilateral groin access sites clean, dry and intact. No signs of bleeding/hematoma. +2 distal pulses equally and bilaterally. Patient sedated on 45 mcg/kg of propofol. Epi @ 0.04 mcg/kg to maintain MAP >65. Patient following commands off of sedation. Soft wrist restraints applied bilaterally, no signs of injury. Plan of care discussed with patient. All questions answered. See flowsheets for details. WCTM

## 2018-11-08 NOTE — SUBJECTIVE & OBJECTIVE
Interval History: No events over night. Telemetry: HR 70 V-P    Review of Systems   Unable to perform ROS: intubated     Objective:     Vital Signs (Most Recent):  Temp: 97.1 °F (36.2 °C) (11/08/18 0700)  Pulse: 69 (11/08/18 0916)  Resp: 18 (11/08/18 0916)  BP: 117/75 (11/08/18 0800)  SpO2: 95 % (11/08/18 0916) Vital Signs (24h Range):  Temp:  [96 °F (35.6 °C)-98 °F (36.7 °C)] 97.1 °F (36.2 °C)  Pulse:  [68-71] 69  Resp:  [12-63] 18  SpO2:  [91 %-100 %] 95 %  BP: ()/(57-76) 117/75  Arterial Line BP: ()/(52-67) 130/67     Weight: 76.7 kg (169 lb)  Body mass index is 22.92 kg/m².     SpO2: 95 %  O2 Device (Oxygen Therapy): ventilator    Physical Exam   Constitutional: He is oriented to person, place, and time. He appears well-developed and well-nourished.   HENT:   Head: Normocephalic and atraumatic.   Nose: Nose normal.   Mouth/Throat: No oropharyngeal exudate.   Eyes: Right eye exhibits no discharge. Left eye exhibits no discharge. No scleral icterus.   Neck: Normal range of motion. Neck supple. No JVD present.   Cardiovascular: Normal rate, regular rhythm, S1 normal and S2 normal. Exam reveals no gallop, no S3, no S4, no distant heart sounds, no friction rub, no midsystolic click and no opening snap.   No murmur heard.  Pulses:       Radial pulses are 2+ on the right side, and 2+ on the left side.        Femoral pulses are 2+ on the right side, and 2+ on the left side.  Pulmonary/Chest: Effort normal and breath sounds normal. No respiratory distress. He has no wheezes. He has no rales. He exhibits no tenderness.   Abdominal: Soft. Bowel sounds are normal. He exhibits no distension. There is no tenderness. There is no rebound.   Musculoskeletal: Normal range of motion. He exhibits no edema, tenderness or deformity.   Lymphadenopathy:     He has no cervical adenopathy.   Neurological: He is alert and oriented to person, place, and time. No cranial nerve deficit.   Skin: Skin is warm and dry.    Psychiatric: He has a normal mood and affect. His behavior is normal.       Significant Labs:   Blood Culture: No results for input(s): LABBLOO in the last 48 hours., BMP:   Recent Labs   Lab 11/08/18 0303         K 3.6      CO2 25   BUN 21   CREATININE 1.1   CALCIUM 8.1*   MG 2.4   , CMP:   Recent Labs   Lab 11/08/18 0303      K 3.6      CO2 25      BUN 21   CREATININE 1.1   CALCIUM 8.1*   PROT 6.7   ALBUMIN 3.0*   BILITOT 0.9   ALKPHOS 91   AST 95*   ALT 59*   ANIONGAP 10   ESTGFRAFRICA >60.0   EGFRNONAA >60.0    and CBC:   Recent Labs   Lab 11/08/18 0303   WBC 12.53   HGB 13.0*   HCT 38.6*   *       Significant Imaging: Telemetry, TTE, XRAys

## 2018-11-08 NOTE — ASSESSMENT & PLAN NOTE
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    Post procedure Xray w/o pneumothorax (abdominal Xray with extension into chest)    F/U EP recs:  -Continue dig, amiodarone  -Hold dilt and BB  -NPO for potential LV lead revision and venogram in the AM  -Continue weaning epi as tolerated  -Continue intubation/sedation for now, likely till after the EP procedures unless otherwise indicated.

## 2018-11-09 LAB
ALBUMIN SERPL BCP-MCNC: 2.8 G/DL
ALBUMIN SERPL BCP-MCNC: 2.8 G/DL
ALP SERPL-CCNC: 88 U/L
ALP SERPL-CCNC: 98 U/L
ALT SERPL W/O P-5'-P-CCNC: 37 U/L
ALT SERPL W/O P-5'-P-CCNC: 41 U/L
ANION GAP SERPL CALC-SCNC: 11 MMOL/L
ANION GAP SERPL CALC-SCNC: 8 MMOL/L
AST SERPL-CCNC: 45 U/L
AST SERPL-CCNC: 55 U/L
BASOPHILS # BLD AUTO: 0.02 K/UL
BASOPHILS NFR BLD: 0.1 %
BILIRUB SERPL-MCNC: 1.3 MG/DL
BILIRUB SERPL-MCNC: 1.6 MG/DL
BUN SERPL-MCNC: 18 MG/DL
BUN SERPL-MCNC: 23 MG/DL
CALCIUM SERPL-MCNC: 8 MG/DL
CALCIUM SERPL-MCNC: 8.6 MG/DL
CHLORIDE SERPL-SCNC: 102 MMOL/L
CHLORIDE SERPL-SCNC: 107 MMOL/L
CO2 SERPL-SCNC: 23 MMOL/L
CO2 SERPL-SCNC: 27 MMOL/L
CREAT SERPL-MCNC: 1 MG/DL
CREAT SERPL-MCNC: 1 MG/DL
DIFFERENTIAL METHOD: ABNORMAL
EOSINOPHIL # BLD AUTO: 0 K/UL
EOSINOPHIL NFR BLD: 0.1 %
ERYTHROCYTE [DISTWIDTH] IN BLOOD BY AUTOMATED COUNT: 14.9 %
EST. GFR  (AFRICAN AMERICAN): >60 ML/MIN/1.73 M^2
EST. GFR  (AFRICAN AMERICAN): >60 ML/MIN/1.73 M^2
EST. GFR  (NON AFRICAN AMERICAN): >60 ML/MIN/1.73 M^2
EST. GFR  (NON AFRICAN AMERICAN): >60 ML/MIN/1.73 M^2
GLUCOSE SERPL-MCNC: 106 MG/DL
GLUCOSE SERPL-MCNC: 111 MG/DL
HCT VFR BLD AUTO: 39.9 %
HGB BLD-MCNC: 12.7 G/DL
IMM GRANULOCYTES # BLD AUTO: 0.07 K/UL
IMM GRANULOCYTES NFR BLD AUTO: 0.5 %
INR PPP: 2.8
LYMPHOCYTES # BLD AUTO: 0.7 K/UL
LYMPHOCYTES NFR BLD: 4.4 %
MAGNESIUM SERPL-MCNC: 2 MG/DL
MAGNESIUM SERPL-MCNC: 2.2 MG/DL
MAGNESIUM SERPL-MCNC: 2.2 MG/DL
MCH RBC QN AUTO: 30 PG
MCHC RBC AUTO-ENTMCNC: 31.8 G/DL
MCV RBC AUTO: 94 FL
MONOCYTES # BLD AUTO: 1.3 K/UL
MONOCYTES NFR BLD: 8.7 %
NEUTROPHILS # BLD AUTO: 12.6 K/UL
NEUTROPHILS NFR BLD: 86.2 %
NRBC BLD-RTO: 0 /100 WBC
PHOSPHATE SERPL-MCNC: 3 MG/DL
PHOSPHATE SERPL-MCNC: 3.6 MG/DL
PLATELET # BLD AUTO: 141 K/UL
PMV BLD AUTO: 11.6 FL
POTASSIUM SERPL-SCNC: 3.5 MMOL/L
POTASSIUM SERPL-SCNC: 3.9 MMOL/L
POTASSIUM SERPL-SCNC: 4.9 MMOL/L
PROT SERPL-MCNC: 5.9 G/DL
PROT SERPL-MCNC: 6.4 G/DL
PROTHROMBIN TIME: 27 SEC
RBC # BLD AUTO: 4.24 M/UL
SODIUM SERPL-SCNC: 137 MMOL/L
SODIUM SERPL-SCNC: 141 MMOL/L
WBC # BLD AUTO: 14.64 K/UL

## 2018-11-09 PROCEDURE — 83735 ASSAY OF MAGNESIUM: CPT | Mod: 91

## 2018-11-09 PROCEDURE — 25000003 PHARM REV CODE 250: Performed by: INTERNAL MEDICINE

## 2018-11-09 PROCEDURE — G8987 SELF CARE CURRENT STATUS: HCPCS | Mod: CK

## 2018-11-09 PROCEDURE — 84132 ASSAY OF SERUM POTASSIUM: CPT

## 2018-11-09 PROCEDURE — 63600175 PHARM REV CODE 636 W HCPCS: Performed by: INTERNAL MEDICINE

## 2018-11-09 PROCEDURE — G8997 SWALLOW GOAL STATUS: HCPCS | Mod: CH

## 2018-11-09 PROCEDURE — G8989 SELF CARE D/C STATUS: HCPCS | Mod: CK

## 2018-11-09 PROCEDURE — 97165 OT EVAL LOW COMPLEX 30 MIN: CPT

## 2018-11-09 PROCEDURE — G8996 SWALLOW CURRENT STATUS: HCPCS | Mod: CH

## 2018-11-09 PROCEDURE — 84100 ASSAY OF PHOSPHORUS: CPT | Mod: 91

## 2018-11-09 PROCEDURE — G8998 SWALLOW D/C STATUS: HCPCS | Mod: CH

## 2018-11-09 PROCEDURE — 99232 SBSQ HOSP IP/OBS MODERATE 35: CPT | Mod: GC,,, | Performed by: INTERNAL MEDICINE

## 2018-11-09 PROCEDURE — 97535 SELF CARE MNGMENT TRAINING: CPT

## 2018-11-09 PROCEDURE — 63600175 PHARM REV CODE 636 W HCPCS: Performed by: STUDENT IN AN ORGANIZED HEALTH CARE EDUCATION/TRAINING PROGRAM

## 2018-11-09 PROCEDURE — 84100 ASSAY OF PHOSPHORUS: CPT

## 2018-11-09 PROCEDURE — 25000003 PHARM REV CODE 250: Performed by: STUDENT IN AN ORGANIZED HEALTH CARE EDUCATION/TRAINING PROGRAM

## 2018-11-09 PROCEDURE — 20600001 HC STEP DOWN PRIVATE ROOM

## 2018-11-09 PROCEDURE — 80053 COMPREHEN METABOLIC PANEL: CPT | Mod: 91

## 2018-11-09 PROCEDURE — 93010 ELECTROCARDIOGRAM REPORT: CPT | Mod: ,,, | Performed by: INTERNAL MEDICINE

## 2018-11-09 PROCEDURE — 93005 ELECTROCARDIOGRAM TRACING: CPT

## 2018-11-09 PROCEDURE — 85025 COMPLETE CBC W/AUTO DIFF WBC: CPT

## 2018-11-09 PROCEDURE — 80053 COMPREHEN METABOLIC PANEL: CPT

## 2018-11-09 PROCEDURE — 85610 PROTHROMBIN TIME: CPT

## 2018-11-09 PROCEDURE — 83735 ASSAY OF MAGNESIUM: CPT

## 2018-11-09 PROCEDURE — G8988 SELF CARE GOAL STATUS: HCPCS | Mod: CH

## 2018-11-09 PROCEDURE — 92610 EVALUATE SWALLOWING FUNCTION: CPT

## 2018-11-09 RX ORDER — CARVEDILOL 6.25 MG/1
6.25 TABLET ORAL 2 TIMES DAILY
Status: DISCONTINUED | OUTPATIENT
Start: 2018-11-09 | End: 2018-11-10

## 2018-11-09 RX ORDER — POTASSIUM CHLORIDE 7.46 G/1000ML
10 INJECTION, SOLUTION INTRAVENOUS ONCE
Status: COMPLETED | OUTPATIENT
Start: 2018-11-09 | End: 2018-11-09

## 2018-11-09 RX ORDER — FUROSEMIDE 10 MG/ML
60 INJECTION INTRAMUSCULAR; INTRAVENOUS ONCE
Status: COMPLETED | OUTPATIENT
Start: 2018-11-09 | End: 2018-11-09

## 2018-11-09 RX ORDER — FUROSEMIDE 40 MG/1
40 TABLET ORAL DAILY
Status: DISCONTINUED | OUTPATIENT
Start: 2018-11-09 | End: 2018-11-12

## 2018-11-09 RX ORDER — ACETAMINOPHEN 325 MG/1
325 TABLET ORAL EVERY 4 HOURS PRN
Status: DISPENSED | OUTPATIENT
Start: 2018-11-09 | End: 2018-11-12

## 2018-11-09 RX ORDER — NAPROXEN SODIUM 220 MG/1
81 TABLET, FILM COATED ORAL DAILY
Status: DISCONTINUED | OUTPATIENT
Start: 2018-11-09 | End: 2018-11-13 | Stop reason: HOSPADM

## 2018-11-09 RX ORDER — HYDROCODONE BITARTRATE AND ACETAMINOPHEN 5; 325 MG/1; MG/1
1 TABLET ORAL EVERY 4 HOURS PRN
Status: DISPENSED | OUTPATIENT
Start: 2018-11-09 | End: 2018-11-12

## 2018-11-09 RX ADMIN — POTASSIUM CHLORIDE 10 MEQ: 7.46 INJECTION, SOLUTION INTRAVENOUS at 05:11

## 2018-11-09 RX ADMIN — ASPIRIN 81 MG CHEWABLE TABLET 81 MG: 81 TABLET CHEWABLE at 09:11

## 2018-11-09 RX ADMIN — POTASSIUM BICARBONATE 50 MEQ: 25 TABLET, EFFERVESCENT ORAL at 10:11

## 2018-11-09 RX ADMIN — POTASSIUM BICARBONATE 50 MEQ: 25 TABLET, EFFERVESCENT ORAL at 04:11

## 2018-11-09 RX ADMIN — ACETAMINOPHEN 325 MG: 325 TABLET, FILM COATED ORAL at 09:11

## 2018-11-09 RX ADMIN — FUROSEMIDE 60 MG: 10 INJECTION, SOLUTION INTRAMUSCULAR; INTRAVENOUS at 06:11

## 2018-11-09 RX ADMIN — EPLERENONE 25 MG: 25 TABLET ORAL at 09:11

## 2018-11-09 RX ADMIN — DIGOXIN 125 MCG: 125 TABLET ORAL at 09:11

## 2018-11-09 RX ADMIN — ATORVASTATIN CALCIUM 80 MG: 20 TABLET, FILM COATED ORAL at 08:11

## 2018-11-09 RX ADMIN — CARVEDILOL 6.25 MG: 6.25 TABLET, FILM COATED ORAL at 08:11

## 2018-11-09 RX ADMIN — POTASSIUM CHLORIDE 10 MEQ: 7.46 INJECTION, SOLUTION INTRAVENOUS at 04:11

## 2018-11-09 RX ADMIN — VALSARTAN 20 MG: 40 TABLET, FILM COATED ORAL at 10:11

## 2018-11-09 RX ADMIN — CARVEDILOL 6.25 MG: 6.25 TABLET, FILM COATED ORAL at 09:11

## 2018-11-09 RX ADMIN — FUROSEMIDE 40 MG: 40 TABLET ORAL at 04:11

## 2018-11-09 RX ADMIN — VALSARTAN 20 MG: 40 TABLET, FILM COATED ORAL at 08:11

## 2018-11-09 RX ADMIN — AMIODARONE HYDROCHLORIDE 200 MG: 200 TABLET ORAL at 09:11

## 2018-11-09 RX ADMIN — POTASSIUM CHLORIDE 10 MEQ: 7.46 INJECTION, SOLUTION INTRAVENOUS at 06:11

## 2018-11-09 RX ADMIN — PAROXETINE HYDROCHLORIDE 20 MG: 20 TABLET, FILM COATED ORAL at 09:11

## 2018-11-09 RX ADMIN — LEVOTHYROXINE SODIUM 75 MCG: 75 TABLET ORAL at 09:11

## 2018-11-09 RX ADMIN — WARFARIN SODIUM 2.5 MG: 2.5 TABLET ORAL at 04:11

## 2018-11-09 NOTE — ASSESSMENT & PLAN NOTE
-on warfarin   -trend levels daily and adjust for goal INR 2.0-3.0    Lab Results   Component Value Date    INR 2.8 (H) 11/09/2018    INR 1.9 (H) 11/08/2018    INR 3.0 (H) 11/07/2018

## 2018-11-09 NOTE — PLAN OF CARE
Problem: Patient Care Overview  Goal: Individualization & Mutuality  POC reviewed with patient and wife at bedside. Patient remaining on mechanical ventilator today; on spontaneous mode from 0700-til. Patient brought to EP lab ~5pm. On precedex and propofol d/t coughing and gagging on tube as well as anxiety. Intermittent Epi required for MAP goals >= 65. UOP <=30cchr team updated. NS infusing per order. No skin breakdown noted on assessment. All medications thoroughly explained. WCTM

## 2018-11-09 NOTE — ASSESSMENT & PLAN NOTE
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    -Taken for VT ablation, failed  -Taken for LV lead revision 11/8 with EP  -Continues to have PVCs and NSVT (2-4 beats)    EP recs:  -Continue dig, amiodarone  -Electrolyte replacement PRN (K>4, Mg>2)

## 2018-11-09 NOTE — PLAN OF CARE
Problem: Occupational Therapy Goal  Goal: Occupational Therapy Goal  Goals to be met by: 11/16/18     Patient will increase functional independence with ADLs by performing:    UE Dressing with Tutor Key.  LE Dressing with Tutor Key.  Grooming while standing at sink with Tutor Key.  Toileting from toilet with Tutor Key for hygiene and clothing management.   Bathing from  shower chair/bench with Tutor Key.  Toilet transfer to toilet with Tutor Key.  Increased functional strength to WFL for B UE as indicated.  Upper extremity exercise program x15 reps per handout, with independence as indicated.    POC initiated.

## 2018-11-09 NOTE — SUBJECTIVE & OBJECTIVE
Interval History: Patient extubated overnight and tolerating well. PVCs and non-sustained VT (3 beats) on tele.     Review of Systems   Constitution: Negative.   HENT: Negative.    Eyes: Negative.    Cardiovascular: Positive for irregular heartbeat. Negative for chest pain.   Respiratory: Positive for shortness of breath (improving now that sitting up).    Endocrine: Negative.    Hematologic/Lymphatic: Negative.    Skin: Negative.    Musculoskeletal:        Chest wall pain on right, improves with ice    Gastrointestinal: Negative.    Genitourinary: Negative.    Neurological: Negative.    Psychiatric/Behavioral: Negative.      Objective:     Vital Signs (Most Recent):  Temp: 99.1 °F (37.3 °C) (11/09/18 1016)  Pulse: 93 (11/09/18 1016)  Resp: 20 (11/09/18 1016)  BP: (!) 105/55 (11/09/18 1016)  SpO2: (!) 94 % (11/09/18 0900) Vital Signs (24h Range):  Temp:  [96.6 °F (35.9 °C)-99.1 °F (37.3 °C)] 99.1 °F (37.3 °C)  Pulse:  [69-96] 93  Resp:  [17-37] 20  SpO2:  [92 %-98 %] 94 %  BP: ()/(55-72) 105/55  Arterial Line BP: ()/(48-69) 118/56     Weight: 76.7 kg (169 lb)  Body mass index is 22.92 kg/m².     SpO2: (!) 94 %  O2 Device (Oxygen Therapy): nasal cannula      Intake/Output Summary (Last 24 hours) at 11/9/2018 1022  Last data filed at 11/9/2018 1000  Gross per 24 hour   Intake 1366.74 ml   Output 2151 ml   Net -784.26 ml       Lines/Drains/Airways     Drain                 Urethral Catheter 11/07/18 1945 1 day          Peripheral Intravenous Line                 Peripheral IV - Single Lumen 11/07/18 1541 Left Antecubital 1 day         Peripheral IV - Single Lumen 11/07/18 1541 Left Wrist 1 day                Physical Exam   Constitutional: He appears well-developed and well-nourished. No distress.   HENT:   Head: Normocephalic and atraumatic.   Mouth/Throat: No oropharyngeal exudate.   Eyes: Pupils are equal, round, and reactive to light. No scleral icterus.   Neck: Normal range of motion. Neck supple. No  JVD present.   Cardiovascular:   Murmur heard.  Frequent PVCs and non-sustained VT on tele    Pulmonary/Chest: Effort normal and breath sounds normal. No respiratory distress.   Abdominal: Soft. Bowel sounds are normal. He exhibits no distension. There is no tenderness.   Musculoskeletal: Normal range of motion. He exhibits no edema.   Neurological: He is alert. No cranial nerve deficit.   Skin: Skin is warm and dry. He is not diaphoretic. No erythema.   Vitals reviewed.      Significant Labs:   Recent Results (from the past 24 hour(s))   APTT    Collection Time: 11/08/18 12:05 PM   Result Value Ref Range    aPTT 34.5 (H) 21.0 - 32.0 sec   POCT glucose    Collection Time: 11/08/18 12:12 PM   Result Value Ref Range    POCT Glucose 103 70 - 110 mg/dL   Magnesium    Collection Time: 11/08/18  9:50 PM   Result Value Ref Range    Magnesium 2.1 1.6 - 2.6 mg/dL   Basic metabolic panel    Collection Time: 11/08/18  9:50 PM   Result Value Ref Range    Sodium 141 136 - 145 mmol/L    Potassium 3.8 3.5 - 5.1 mmol/L    Chloride 106 95 - 110 mmol/L    CO2 25 23 - 29 mmol/L    Glucose 116 (H) 70 - 110 mg/dL    BUN, Bld 19 8 - 23 mg/dL    Creatinine 1.0 0.5 - 1.4 mg/dL    Calcium 8.4 (L) 8.7 - 10.5 mg/dL    Anion Gap 10 8 - 16 mmol/L    eGFR if African American >60.0 >60 mL/min/1.73 m^2    eGFR if non African American >60.0 >60 mL/min/1.73 m^2   Magnesium    Collection Time: 11/09/18  2:34 AM   Result Value Ref Range    Magnesium 2.0 1.6 - 2.6 mg/dL   CBC auto differential    Collection Time: 11/09/18  2:34 AM   Result Value Ref Range    WBC 14.64 (H) 3.90 - 12.70 K/uL    RBC 4.24 (L) 4.60 - 6.20 M/uL    Hemoglobin 12.7 (L) 14.0 - 18.0 g/dL    Hematocrit 39.9 (L) 40.0 - 54.0 %    MCV 94 82 - 98 fL    MCH 30.0 27.0 - 31.0 pg    MCHC 31.8 (L) 32.0 - 36.0 g/dL    RDW 14.9 (H) 11.5 - 14.5 %    Platelets 141 (L) 150 - 350 K/uL    MPV 11.6 9.2 - 12.9 fL    Immature Granulocytes 0.5 0.0 - 0.5 %    Gran # (ANC) 12.6 (H) 1.8 - 7.7 K/uL     Immature Grans (Abs) 0.07 (H) 0.00 - 0.04 K/uL    Lymph # 0.7 (L) 1.0 - 4.8 K/uL    Mono # 1.3 (H) 0.3 - 1.0 K/uL    Eos # 0.0 0.0 - 0.5 K/uL    Baso # 0.02 0.00 - 0.20 K/uL    nRBC 0 0 /100 WBC    Gran% 86.2 (H) 38.0 - 73.0 %    Lymph% 4.4 (L) 18.0 - 48.0 %    Mono% 8.7 4.0 - 15.0 %    Eosinophil% 0.1 0.0 - 8.0 %    Basophil% 0.1 0.0 - 1.9 %    Differential Method Automated    Comprehensive metabolic panel    Collection Time: 11/09/18  2:34 AM   Result Value Ref Range    Sodium 141 136 - 145 mmol/L    Potassium 3.5 3.5 - 5.1 mmol/L    Chloride 107 95 - 110 mmol/L    CO2 23 23 - 29 mmol/L    Glucose 106 70 - 110 mg/dL    BUN, Bld 18 8 - 23 mg/dL    Creatinine 1.0 0.5 - 1.4 mg/dL    Calcium 8.0 (L) 8.7 - 10.5 mg/dL    Total Protein 5.9 (L) 6.0 - 8.4 g/dL    Albumin 2.8 (L) 3.5 - 5.2 g/dL    Total Bilirubin 1.3 (H) 0.1 - 1.0 mg/dL    Alkaline Phosphatase 88 55 - 135 U/L    AST 55 (H) 10 - 40 U/L    ALT 41 10 - 44 U/L    Anion Gap 11 8 - 16 mmol/L    eGFR if African American >60.0 >60 mL/min/1.73 m^2    eGFR if non African American >60.0 >60 mL/min/1.73 m^2   Phosphorus    Collection Time: 11/09/18  2:34 AM   Result Value Ref Range    Phosphorus 3.6 2.7 - 4.5 mg/dL   Protime-INR    Collection Time: 11/09/18  9:33 AM   Result Value Ref Range    Prothrombin Time 27.0 (H) 9.0 - 12.5 sec    INR 2.8 (H) 0.8 - 1.2         Significant Imaging: no new img

## 2018-11-09 NOTE — PT/OT/SLP EVAL
Speech Language Pathology  Bedside Swallow Evaluation and Discharge Summary    Patient Name:  Donavan Mathews   MRN:  [id number]  Admitting Diagnosis: VT (ventricular tachycardia)    Recommendations:                 General Recommendations:  Follow up not indicated,  please re-consult if any change in status should occur. MD team discontinued orders  Diet recommendations: Regular  , Thin   Aspiration Precautions: Small bites/sips, attention to each bite/sip, only when awake/alert, all PO up at 90 degree angle, remain upright at least 30 minutes following all PO.   please Continue to monitor for signs and symptoms of aspiration and discontinue oral feeding should you notice any of the following: watery eyes, reddened facial area, wet vocal quality, increased work of breathing, change in respiratory status, increased congestion, coughing, fever, and.or change in cognitive status.   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    Prior Intubation HX:  11/07/2018- 11/08/2018    Chest X-Rays: 11/09/2018: Cardiac pacemaker and wires noted.  Heart is enlarged.  Increase in the pulmonary vascular and interstitial markings.  Probable bilateral effusions.  No convincing pneumothorax.    Prior diet: reg/thin     Occupation/hobbies/homemaking: Retired     Subjective     SLP reviewed Pt with nurse, nurse reported no difficulty with medications or  "breakfast  Pt presents calm  He asks, "Can I have some orange juice?"    Pain/Comfort:  · Pain Rating 1: 0/10    Objective:   Pt seen across two sessions. Pt seen in am and again in pm with dinner meal tray.     Oral Musculature Evaluation  · Oral Musculature: WFL  · Dentition: partials, other (see comments)(lower dentures not present)  · Secretion Management: adequate  · Mucosal Quality: adequate  · Mandibular Strength and Mobility: WNL  · Oral Labial Strength and Mobility: WNL  · Lingual Strength and Mobility: WNL  · Volitional Cough: elicited   · Volitional Swallow: elicited   · Voice Prior to PO Intake: clear     Bedside Swallow Eval:   Consistencies Assessed:  · Thin liquids cup edge sips x2, straw sips x4  · Solids bites of pork chop x2     Oral Phase:   · WFL    Pharyngeal Phase:   · no overt clinical signs/symptoms of aspiration    Compensatory Strategies  · None    Treatment: Pt accepted trials of thin liquids only during initial assessment. No overt S/S aspiration noted with thin liquids. Patient and Spouse educated on aspiration precautions and plans for SLP to re-attempt later service day. Patient seen later service day with dinner meal tray. Pt observed with self-0fed sips of boost and bites of pork chops. No overt S/S aspiration noted with self-fed bites/sips of regular textures or thin liquids. Patient and Spouse educated on SLP role, S/S aspiration, safe swallow strategies and option to reconsult ST should they notice any change in status. No questions noted. Whiteboard updated.     Assessment:     Donavan Mathews is a 78 y.o. male with an SLP diagnosis of risk for aspiration s/o extubation.  He presents with no overt S/S aspiration with trials presented at the bedside. No additional ST warranted at this time Please reconsult should status change.     Goals:   Multidisciplinary Problems     SLP Goals        Problem: SLP Goal    Goal Priority Disciplines Outcome   SLP Goal     SLP                    Plan: "     · Patient to be seen:  3 x/week   · Plan of Care expires:  12/09/18  · Plan of Care reviewed with:  patient, spouse   · SLP Follow-Up:  no      Discharge recommendations:  home(no additional ST anticipated following d/c from acute)     Time Tracking:     SLP Treatment Date:   11/09/18  Speech Start Time:  1025 /17:24  Speech Stop Time:  1035   /17:34  Speech Total Time (min):  10 min/ 8 minutes  Pt declined regular trials upon first attempt, seen across 2 sessions for full assessment     Billable Minutes: Treatment Swallowing Dysfunction 18     NADYA Bowser, [facility]SLP  Speech-Language Pathology  Pager: [phone]      11/09/2018

## 2018-11-09 NOTE — NURSING
"EP saw pt and took off ice and replaced with a compression dressing d/t "hematoma" per pt and family.  "

## 2018-11-09 NOTE — HOSPITAL COURSE
Patient admitted for recurrent pleomorphic VT sustaining several shocks from AICD, taken to EP lab for VT ablation. Patient came back from EP lab intubated and on pressors. If was felt that the LV lead was not capturing and needed LV lead revision and venogram. Patient came back from EP lab intubated and on pressors. Patient weaned off pressor support and extubated 11/8 in evening. Patient tolerating post procedurally. Increased Toprol XL to 100 BID, tolerated well and discharged same day.

## 2018-11-09 NOTE — PLAN OF CARE
Problem: Patient Care Overview  Goal: Plan of Care Review  Outcome: Ongoing (interventions implemented as appropriate)  Patient VSS, no acute events noted this shift. Patient received from EP @ 2140 this shift. Weaned Epi off @ 0300. MAP >65. O2 titrated to 2L N/C O2 saturations >92%. Electrolyte's replaced. Morphine 1mg given x1 for pain, ice applied to surgical site to right chest. Plan of care discussed with patient and patients family members. All questions answered. No complaints. See flowsheets for details. WCTM

## 2018-11-09 NOTE — PROGRESS NOTES
[facility]  Cardiology  Progress Note    Patient Name: Donavan Mathews  MRN: [id number]  Admission Date: 11/7/2018  Hospital Length of Stay: 2 days  Code Status: Full Code   Attending Physician: Binta Virk MD   Primary Care Physician: Tim Luis MD  Expected Discharge Date: 11/12/2018  Principal Problem:VT (ventricular tachycardia)    Subjective:     Hospital Course:   Patient admitted for recurrent pleomorphic VT sustaining several shocks from AICD, taken to EP lab for VT ablation. Patient came back from EP lab intubated and on pressors. If was felt that the LV lead was not capturing and needed LV lead revision and venogram. Patient came back from EP lab intubated and on pressors. Patient weaned off pressor support and extubated 11/8 in evening. Patient tolerating post procedurally.    Interval History: Patient extubated overnight and tolerating well. PVCs and non-sustained VT (3 beats) on tele.     Review of Systems   Constitution: Negative.   HENT: Negative.    Eyes: Negative.    Cardiovascular: Positive for irregular heartbeat. Negative for chest pain.   Respiratory: Positive for shortness of breath (improving now that sitting up).    Endocrine: Negative.    Hematologic/Lymphatic: Negative.    Skin: Negative.    Musculoskeletal:        Chest wall pain on right, improves with ice    Gastrointestinal: Negative.    Genitourinary: Negative.    Neurological: Negative.    Psychiatric/Behavioral: Negative.      Objective:     Vital Signs (Most Recent):  Temp: 99.1 °F (37.3 °C) (11/09/18 1016)  Pulse: 93 (11/09/18 1016)  Resp: 20 (11/09/18 1016)  BP: (!) 105/55 (11/09/18 1016)  SpO2: (!) 94 % (11/09/18 0900) Vital Signs (24h Range):  Temp:  [96.6 °F (35.9 °C)-99.1 °F (37.3 °C)] 99.1 °F (37.3 °C)  Pulse:  [69-96] 93  Resp:  [17-37] 20  SpO2:  [92 %-98 %] 94 %  BP: ()/(55-72) 105/55  Arterial Line BP: ()/(48-69) 118/56     Weight: 76.7 kg (169 lb)  Body mass index is 22.92  kg/m².     SpO2: (!) 94 %  O2 Device (Oxygen Therapy): nasal cannula      Intake/Output Summary (Last 24 hours) at 11/9/2018 1022  Last data filed at 11/9/2018 1000  Gross per 24 hour   Intake 1366.74 ml   Output 2151 ml   Net -784.26 ml       Lines/Drains/Airways     Drain                 Urethral Catheter 11/07/18 1945 1 day          Peripheral Intravenous Line                 Peripheral IV - Single Lumen 11/07/18 1541 Left Antecubital 1 day         Peripheral IV - Single Lumen 11/07/18 1541 Left Wrist 1 day                Physical Exam   Constitutional: He appears well-developed and well-nourished. No distress.   HENT:   Head: Normocephalic and atraumatic.   Mouth/Throat: No oropharyngeal exudate.   Eyes: Pupils are equal, round, and reactive to light. No scleral icterus.   Neck: Normal range of motion. Neck supple. No JVD present.   Cardiovascular:   Murmur heard.  Frequent PVCs and non-sustained VT on tele    Pulmonary/Chest: Effort normal and breath sounds normal. No respiratory distress.   Abdominal: Soft. Bowel sounds are normal. He exhibits no distension. There is no tenderness.   Musculoskeletal: Normal range of motion. He exhibits no edema.   Neurological: He is alert. No cranial nerve deficit.   Skin: Skin is warm and dry. He is not diaphoretic. No erythema.   Vitals reviewed.      Significant Labs:   Recent Results (from the past 24 hour(s))   APTT    Collection Time: 11/08/18 12:05 PM   Result Value Ref Range    aPTT 34.5 (H) 21.0 - 32.0 sec   POCT glucose    Collection Time: 11/08/18 12:12 PM   Result Value Ref Range    POCT Glucose 103 70 - 110 mg/dL   Magnesium    Collection Time: 11/08/18  9:50 PM   Result Value Ref Range    Magnesium 2.1 1.6 - 2.6 mg/dL   Basic metabolic panel    Collection Time: 11/08/18  9:50 PM   Result Value Ref Range    Sodium 141 136 - 145 mmol/L    Potassium 3.8 3.5 - 5.1 mmol/L    Chloride 106 95 - 110 mmol/L    CO2 25 23 - 29 mmol/L    Glucose 116 (H) 70 - 110 mg/dL     BUN, Bld 19 8 - 23 mg/dL    Creatinine 1.0 0.5 - 1.4 mg/dL    Calcium 8.4 (L) 8.7 - 10.5 mg/dL    Anion Gap 10 8 - 16 mmol/L    eGFR if African American >60.0 >60 mL/min/1.73 m^2    eGFR if non African American >60.0 >60 mL/min/1.73 m^2   Magnesium    Collection Time: 11/09/18  2:34 AM   Result Value Ref Range    Magnesium 2.0 1.6 - 2.6 mg/dL   CBC auto differential    Collection Time: 11/09/18  2:34 AM   Result Value Ref Range    WBC 14.64 (H) 3.90 - 12.70 K/uL    RBC 4.24 (L) 4.60 - 6.20 M/uL    Hemoglobin 12.7 (L) 14.0 - 18.0 g/dL    Hematocrit 39.9 (L) 40.0 - 54.0 %    MCV 94 82 - 98 fL    MCH 30.0 27.0 - 31.0 pg    MCHC 31.8 (L) 32.0 - 36.0 g/dL    RDW 14.9 (H) 11.5 - 14.5 %    Platelets 141 (L) 150 - 350 K/uL    MPV 11.6 9.2 - 12.9 fL    Immature Granulocytes 0.5 0.0 - 0.5 %    Gran # (ANC) 12.6 (H) 1.8 - 7.7 K/uL    Immature Grans (Abs) 0.07 (H) 0.00 - 0.04 K/uL    Lymph # 0.7 (L) 1.0 - 4.8 K/uL    Mono # 1.3 (H) 0.3 - 1.0 K/uL    Eos # 0.0 0.0 - 0.5 K/uL    Baso # 0.02 0.00 - 0.20 K/uL    nRBC 0 0 /100 WBC    Gran% 86.2 (H) 38.0 - 73.0 %    Lymph% 4.4 (L) 18.0 - 48.0 %    Mono% 8.7 4.0 - 15.0 %    Eosinophil% 0.1 0.0 - 8.0 %    Basophil% 0.1 0.0 - 1.9 %    Differential Method Automated    Comprehensive metabolic panel    Collection Time: 11/09/18  2:34 AM   Result Value Ref Range    Sodium 141 136 - 145 mmol/L    Potassium 3.5 3.5 - 5.1 mmol/L    Chloride 107 95 - 110 mmol/L    CO2 23 23 - 29 mmol/L    Glucose 106 70 - 110 mg/dL    BUN, Bld 18 8 - 23 mg/dL    Creatinine 1.0 0.5 - 1.4 mg/dL    Calcium 8.0 (L) 8.7 - 10.5 mg/dL    Total Protein 5.9 (L) 6.0 - 8.4 g/dL    Albumin 2.8 (L) 3.5 - 5.2 g/dL    Total Bilirubin 1.3 (H) 0.1 - 1.0 mg/dL    Alkaline Phosphatase 88 55 - 135 U/L    AST 55 (H) 10 - 40 U/L    ALT 41 10 - 44 U/L    Anion Gap 11 8 - 16 mmol/L    eGFR if African American >60.0 >60 mL/min/1.73 m^2    eGFR if non African American >60.0 >60 mL/min/1.73 m^2   Phosphorus    Collection Time: 11/09/18   2:34 AM   Result Value Ref Range    Phosphorus 3.6 2.7 - 4.5 mg/dL   Protime-INR    Collection Time: 11/09/18  9:33 AM   Result Value Ref Range    Prothrombin Time 27.0 (H) 9.0 - 12.5 sec    INR 2.8 (H) 0.8 - 1.2         Significant Imaging: no new img     Assessment and Plan:     Brief HPI: see above    * VT (ventricular tachycardia)    78 yr old man with PMH of persistent Afib, Mixed CM Ef 25% s/p Medtronic CRTD upgrade 1/17, S/p  Mechanical AVR in 1990, s/p 1v CABG(SVG to RCA) 1990,restrictive lung disease potentially associated to amiodarone (per Dr Aguayo's note), recurrent pleomorphic VTs/p multiple defibrillator shock was referred to EP clinic by Dr Mehta for eval of VT.    -Taken for VT ablation, failed  -Taken for LV lead revision 11/8 with EP  -Continues to have PVCs and NSVT (2-4 beats)    EP recs:  -Continue dig, amiodarone  -Electrolyte replacement PRN (K>4, Mg>2)     A-fib    Continuing home medications at this time:  -Amio 200 BID  -ASA 81mg QD  -Carvediolo 6.25mg BID  -Dig 125 mcg QD     -Tele showing multiple PVCs and NSVT 2-4 beats        Cardiomyopathy    Mixed CMP <20% s/p CRT-D  Global hypokinesis worse in inferior and inferoseptal walls  Mildly dilated LV and RV    Reinitiated home meds: (with exception of Cardizem)  -Amio 200 BID  -ASA 81mg QD  -Carvediolo 6.25mg BID (will up titrate to 25)  -Dig 125 mcg QD   - Atorvastatin 80mg QD  - Eplerenone 25    Started:  -Valsartan with intent to transition to entresto        Current use of long term anticoagulation    -on warfarin   -trend levels daily and adjust for goal INR 2.0-3.0    Lab Results   Component Value Date    INR 2.8 (H) 11/09/2018    INR 1.9 (H) 11/08/2018    INR 3.0 (H) 11/07/2018          Hx of CABG    -see above        S/P AVR (aortic valve replacement)    -Pharmacy consult for warfarin dosing  -Continue warfarin, per Dr Aguayo's note from clinics         VTE Risk Mitigation (From admission, onward)        Ordered     warfarin  (COUMADIN) tablet 2.5 mg  Daily      11/08/18 1951        Plan discussed with attending Dr. Binta Virk MD , further recommendations as per attending addendum. Please feel free to call with any questions or concerns.        Samina Jacob MD  Cardiology  [facility]

## 2018-11-09 NOTE — TRANSFER OF CARE
Anesthesia Transfer of Care Note    Patient: Donavan Mathews    Procedure(s) Performed: Procedure(s) (LRB):  REVISION, INSERTION, ELECTRODE LEAD, ICD (Right)  Venogram, EP Lab (Right)  Revision, Skin Pocket, For Cardiac Pacemaker (Right)    Patient location: ICU    Anesthesia Type: general    Transport from OR: Transported from OR on 6-10 L/min O2 by face mask with adequate spontaneous ventilation    Post pain: adequate analgesia    Post assessment: no apparent anesthetic complications    Post vital signs: stable    Level of consciousness: awake    Nausea/Vomiting: no nausea/vomiting    Complications: none    Transfer of care protocol was followed      Last vitals:   Visit Vitals  BP (!) 90/58 (BP Location: Right arm, Patient Position: Lying)   Pulse 69   Temp 36.6 °C (97.8 °F) (Axillary)   Resp 17   Ht 6' (1.829 m)   Wt 76.7 kg (169 lb)   SpO2 97%   BMI 22.92 kg/m²

## 2018-11-09 NOTE — PT/OT/SLP EVAL
Occupational Therapy   Evaluation    Name: Donavan Mathews  MRN: [id number]  Admitting Diagnosis:  VT (ventricular tachycardia) 1 Day Post-Op    Recommendations:     Discharge Recommendations: home  Discharge Equipment Recommendations:  none  Barriers to discharge:  None    History:     Occupational Profile:  Living Environment: Pt lives in a one story house c no DB and has a walk-in shower and also has a tub/shower combo.  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    Pain/Comfort:  · Pain Rating 1: 0/10    Patients cultural, spiritual, Anabaptist conflicts given the current situation: None    Objective:     Communicated with: RN prior to session.  Patient found all lines intact, call button in reach, RN notified and family present and blood pressure cuff, gifford catheter, oxygen, peripheral IV, pulse ox (continuous), SCD, telemetry upon OT entry to room.    General Precautions: Standard, aspiration, respiratory, pacemaker   Orthopedic Precautions:N/A   Braces: Sling and swathe     Occupational Performance:    Bed Mobility:    · Pt found UIC upon arrival.    Functional Mobility/Transfers:  · Patient completed Sit <> Stand Transfer with contact guard assistance  with  no assistive device   · Functional Mobility: Pt was able to walk to sink x3 times.    Activities of  "Daily Living:  · Grooming: modified independence to comb hair.  · Upper Body Dressing: maximal assistance to don hospital gown 2* IV lines.    Cognitive/Visual Perceptual:  Cognitive/Psychosocial Skills:     -       Oriented to: Person, Place, Time and Situation   -       Follows Commands/attention:Follows multistep  commands    Physical Exam:  Upper Extremity Range of Motion:     -       Right Upper Extremity: NT R UE in sling and swathe.  -       Left Upper Extremity: WFL L UE  Upper Extremity Strength:    -       Right Upper Extremity: NT 2* sling and swathe.  -       Left Upper Extremity: WFL L UE    AMPAC 6 Click ADL:  AMPAC Total Score: 19      Education:    Patient left up in chair with all lines intact, call button in reach, RN notified and family present    Assessment:     Donavan Mathews is a 78 y.o. male with a medical diagnosis of VT (ventricular tachycardia).  He presents with the following performance deficits affecting function: weakness, impaired endurance, impaired self care skills, impaired functional mobilty, decreased upper extremity function.  Pt was able to perform sit/stand T/F c CGA and walk to sink c CGA x3 times.  Able to perform grooming c set-up and LB dressing c min A and UB dressing c max A.  L UE is WFL.  Edcuated pt on pacemaker precautions.    Rehab Prognosis: Good; patient would benefit from acute skilled OT services to address these deficits and reach maximum level of function.         Clinical Decision Makin.  OT Low:  "Pt evaluation falls under low complexity for evaluation coding due to performance deficits noted in 1-3 areas as stated above and 0 co-morbities affecting current functional status. Data obtained from problem focused assessments. No modifications or assistance was required for completion of evaluation. Only brief occupational profile and history review completed."     Plan:     Patient to be seen 4 x/week to address the above listed problems via self-care/home " management, therapeutic activities, therapeutic exercises  · Plan of Care Expires: 11/16/18  · Plan of Care Reviewed with: patient, spouse    This Plan of care has been discussed with the patient who was involved in its development and understands and is in agreement with the identified goals and treatment plan    GOALS:   Multidisciplinary Problems     Occupational Therapy Goals        Problem: Occupational Therapy Goal    Goal Priority Disciplines Outcome Interventions   Occupational Therapy Goal     OT, PT/OT     Description:  Goals to be met by: 11/16/18     Patient will increase functional independence with ADLs by performing:    UE Dressing with Charlton.  LE Dressing with Charlton.  Grooming while standing at sink with Charlton.  Toileting from toilet with Charlton for hygiene and clothing management.   Bathing from  shower chair/bench with Charlton.  Toilet transfer to toilet with Charlton.  Increased functional strength to WFL for B UE as indicated.  Upper extremity exercise program x15 reps per handout, with independence as indicated.                      Time Tracking:     OT Date of Treatment: 11/09/18  OT Start Time: 1100  OT Stop Time: 1128  OT Total Time (min): 28 min    Billable Minutes:Evaluation 10  Self Care/Home Management 18    LUCAS Manuel  11/9/2018

## 2018-11-09 NOTE — ASSESSMENT & PLAN NOTE
Continuing home medications at this time:  -Amio 200 BID  -ASA 81mg QD  -Carvediolo 6.25mg BID  -Dig 125 mcg QD     -Tele showing multiple PVCs and NSVT 2-4 beats

## 2018-11-09 NOTE — PLAN OF CARE
Problem: Patient Care Overview  Goal: Plan of Care Review  Outcome: Ongoing (interventions implemented as appropriate)  Reviewed plan of care, verbalized understanding  Answered some questions  Pt remained free from falls  Stable VS and tele  Pain controlled, pt prefers tylenol when necessary  Ice on right shoulder for discomfort from procedure  Arm immobilization on pt  Bilateral groin sites free from hematoma, and dressings CDI  Will continue to monitior

## 2018-11-09 NOTE — PHYSICIAN QUERY
"PT Name: Donavan Mathews  MR #: [id number]    Physician Query Form - Heart  Condition Clarification     CDS/: Marcia Carranza RN, CCDS              Contact information: [email or web address]  This form is a permanent document in the medical record.     Query Date: November 9, 2018    By submitting this query, we are merely seeking further clarification of documentation. Please utilize your independent clinical judgment when addressing the question(s) below.    The medical record contains the following   Indicators     Supporting Clinical Findings Location in Medical Record    BNP     X EF · LVEF of < 20%.   10/29 h/p    Radiology findings     X Echo Results Echo with contrast  : 11/18  · Eccentric LVH with LVEF of < 20%. Inferior inferoseptal dyskinesis.  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   · Echo contrast given with no evidence of thrombus  · RV mildly dilated with Low normal RV systolic function  · Biatrial enlargement  · Indeterminant diastolic function with increased LAP  ·  AV with a mean gradient of 6mmHg and DOI of 0.43; there is mild perivalvular AI noted  · Dilated ascending aorta and aortic arch 5 to 5.5 cm  · +3 MR  · Mild TR with estimated PAP of about 50mmHg     10/29 h/p    "Ascites" documented     X "SOB" or "CHAN" documented Positive for SOB  orthopnea 11/7 h/p  11/7 anesthesia    "Hypoxia" documented     X Heart Failure documented CHF,     ischemic cardiomyopathy, 11/7 anesthesia     11/7 h/p    "Edema" documented     X Diuretics/Meds Furosemide 40 mg IV x1  Furosemide 60 mg IV x1  Furosemide 40 mg po daily  Digoxin 125 mcg po daily  Carvedilol 6.25 mg po 2 x daily 11/7 mar  11/9 mar  11/9 mar  11/8-11/9 mar  11/9 mar    Treatment:      Other:      Heart failure (HF) can be acute, chronic or both. It is generally further specificed as systolic, diastolic, or combined. Lastly, it is important to identify an underlying etiology if known or suspected. "     Common clues to acute exacerbation:  Rapidly progressive symptoms (w/in 2 weeks of presentation), using IV diuretics to treat, using supplemental O2, pulmonary edema on Xray, MI w/in 4 weeks, and/or BNP >500    Systolic Heart Failure: is defined as chart documentation of a left ventricular ejection fraction (LVEF) less than 40%     Diastolic Heart Failure: is defined as a left ventricular ejection fraction (LVEF) greater than 40%   +      Evidence of diastolic dysfunction on echocardiography OR    Right heart catheterization wedge pressure above 12 mm Hg OR    Left heart catheterization left ventricular end diastolic pressure 18 mm Hg or above.    References: *American Heart Association    The clinical guidelines noted below are only system guidelines, and do not replace the providers clinical judgment.     Provider, please specify the diagnosis associated with above clinical findings    [   ] Acute on Chronic Systolic Heart Failure- Pre-existing systolic HF diagnosis.  EF < 40%  and acute HF symptoms documented  [   ] Chronic Systolic Heart Failure - Pre-existing systolic HF diagnosis.  EF < 40%  without  acute HF symptoms documented  [   ] Acute on Chronic Combined Systolic and Diastolic Heart Failure                   [  x ] Chronic Combined Systolic and Diastolic Heart Failure  [   ] Other Type of Heart Failure (please specify type): _________________________  [   ] Other (please specify): ___________________________________  [   ] Clinically Undetermined                          Please document in your progress notes daily for the duration of treatment until resolved and include in your discharge summary.

## 2018-11-09 NOTE — ANESTHESIA POSTPROCEDURE EVALUATION
Anesthesia Post Evaluation    Patient: Donavan Mathews    Procedure(s) Performed: Procedure(s) (LRB):  REVISION, INSERTION, ELECTRODE LEAD, ICD (Right)  Venogram, EP Lab (Right)  Revision, Skin Pocket, For Cardiac Pacemaker (Right)    Final Anesthesia Type: general  Patient location during evaluation: ICU  Patient participation: Yes- Able to Participate  Level of consciousness: awake and alert  Post-procedure vital signs: reviewed and stable  Pain management: adequate  Airway patency: patent  PONV status at discharge: No PONV  Anesthetic complications: no      Cardiovascular status: blood pressure returned to baseline  Respiratory status: unassisted, spontaneous ventilation and face mask  Hydration status: euvolemic  Follow-up not needed.        Visit Vitals  BP (!) 90/58 (BP Location: Right arm, Patient Position: Lying)   Pulse 74   Temp 37 °C (98.6 °F) (Oral)   Resp (!) 23   Ht 6' (1.829 m)   Wt 76.7 kg (169 lb)   SpO2 97%   BMI 22.92 kg/m²       Pain/Neftaly Score: Pain Assessment Performed: Yes (11/8/2018  9:40 PM)  Presence of Pain: denies (11/8/2018  9:40 PM)  Pain Rating Prior to Med Admin: 8 (11/7/2018  9:15 PM)

## 2018-11-09 NOTE — PLAN OF CARE
Problem: SLP Goal  Goal: SLP Goal  Outcome: Outcome(s) achieved Date Met: 11/09/18  SLP Bedside Swallow Study completed. No overt S/S aspiration with trials presented. Patient remains at risk of aspiration 2/2 fatigue and respiratory status and standard aspiration precautions are advised. REC: Continue regular diet with thin liquids, whole medications ok, provided standard aspiration precautions. No additional ST warranted at this time.  Please continue to monitor for signs and symptoms of aspiration and discontinue oral feeding should you notice any of the following: watery eyes, reddened facial area, wet vocal quality, increased work of breathing, change in respiratory status, increased congestion, coughing, fever, and/or change in cognitive status. Please reconsult should status change.  Thank you.    SINDHU Bowser., [facility]-SLP  Speech-Language Pathology  Pager: [phone]  11/9/2018

## 2018-11-10 LAB
ALBUMIN SERPL BCP-MCNC: 2.8 G/DL
ALP SERPL-CCNC: 81 U/L
ALP SERPL-CCNC: 90 U/L
ALP SERPL-CCNC: 92 U/L
ALP SERPL-CCNC: 95 U/L
ALT SERPL W/O P-5'-P-CCNC: 31 U/L
ANION GAP SERPL CALC-SCNC: 10 MMOL/L
ANION GAP SERPL CALC-SCNC: 11 MMOL/L
ANION GAP SERPL CALC-SCNC: 8 MMOL/L
ANION GAP SERPL CALC-SCNC: 8 MMOL/L
AST SERPL-CCNC: 35 U/L
AST SERPL-CCNC: 36 U/L
BASOPHILS # BLD AUTO: 0.01 K/UL
BASOPHILS NFR BLD: 0.1 %
BILIRUB SERPL-MCNC: 1.2 MG/DL
BILIRUB SERPL-MCNC: 1.5 MG/DL
BILIRUB SERPL-MCNC: 1.6 MG/DL
BILIRUB SERPL-MCNC: 1.7 MG/DL
BUN SERPL-MCNC: 25 MG/DL
BUN SERPL-MCNC: 26 MG/DL
CALCIUM SERPL-MCNC: 8.9 MG/DL
CALCIUM SERPL-MCNC: 9 MG/DL
CALCIUM SERPL-MCNC: 9.1 MG/DL
CALCIUM SERPL-MCNC: 9.1 MG/DL
CHLORIDE SERPL-SCNC: 100 MMOL/L
CHLORIDE SERPL-SCNC: 101 MMOL/L
CHLORIDE SERPL-SCNC: 102 MMOL/L
CHLORIDE SERPL-SCNC: 103 MMOL/L
CO2 SERPL-SCNC: 27 MMOL/L
CO2 SERPL-SCNC: 27 MMOL/L
CO2 SERPL-SCNC: 28 MMOL/L
CO2 SERPL-SCNC: 29 MMOL/L
CREAT SERPL-MCNC: 1 MG/DL
CREAT SERPL-MCNC: 1 MG/DL
CREAT SERPL-MCNC: 1.1 MG/DL
CREAT SERPL-MCNC: 1.1 MG/DL
DIFFERENTIAL METHOD: ABNORMAL
EOSINOPHIL # BLD AUTO: 0.1 K/UL
EOSINOPHIL NFR BLD: 0.7 %
ERYTHROCYTE [DISTWIDTH] IN BLOOD BY AUTOMATED COUNT: 14.8 %
EST. GFR  (AFRICAN AMERICAN): >60 ML/MIN/1.73 M^2
EST. GFR  (NON AFRICAN AMERICAN): >60 ML/MIN/1.73 M^2
GLUCOSE SERPL-MCNC: 140 MG/DL
GLUCOSE SERPL-MCNC: 144 MG/DL
GLUCOSE SERPL-MCNC: 149 MG/DL
GLUCOSE SERPL-MCNC: 157 MG/DL
HCT VFR BLD AUTO: 41 %
HGB BLD-MCNC: 13.5 G/DL
IMM GRANULOCYTES # BLD AUTO: 0.06 K/UL
IMM GRANULOCYTES NFR BLD AUTO: 0.5 %
INR PPP: 2.7
LYMPHOCYTES # BLD AUTO: 0.9 K/UL
LYMPHOCYTES NFR BLD: 6.9 %
MAGNESIUM SERPL-MCNC: 2.3 MG/DL
MCH RBC QN AUTO: 30.7 PG
MCHC RBC AUTO-ENTMCNC: 32.9 G/DL
MCV RBC AUTO: 93 FL
MONOCYTES # BLD AUTO: 1.3 K/UL
MONOCYTES NFR BLD: 10.4 %
NEUTROPHILS # BLD AUTO: 10 K/UL
NEUTROPHILS NFR BLD: 81.4 %
NRBC BLD-RTO: 0 /100 WBC
PHOSPHATE SERPL-MCNC: 2.4 MG/DL
PLATELET # BLD AUTO: 132 K/UL
PMV BLD AUTO: 11.3 FL
POTASSIUM SERPL-SCNC: 4 MMOL/L
POTASSIUM SERPL-SCNC: 4.1 MMOL/L
PROT SERPL-MCNC: 6.5 G/DL
PROT SERPL-MCNC: 6.6 G/DL
PROTHROMBIN TIME: 25.8 SEC
RBC # BLD AUTO: 4.4 M/UL
SODIUM SERPL-SCNC: 138 MMOL/L
SODIUM SERPL-SCNC: 138 MMOL/L
SODIUM SERPL-SCNC: 139 MMOL/L
SODIUM SERPL-SCNC: 139 MMOL/L
WBC # BLD AUTO: 12.26 K/UL

## 2018-11-10 PROCEDURE — 25000003 PHARM REV CODE 250: Performed by: INTERNAL MEDICINE

## 2018-11-10 PROCEDURE — 99231 SBSQ HOSP IP/OBS SF/LOW 25: CPT | Mod: GC,,, | Performed by: INTERNAL MEDICINE

## 2018-11-10 PROCEDURE — 25000003 PHARM REV CODE 250: Performed by: STUDENT IN AN ORGANIZED HEALTH CARE EDUCATION/TRAINING PROGRAM

## 2018-11-10 PROCEDURE — 20600001 HC STEP DOWN PRIVATE ROOM

## 2018-11-10 PROCEDURE — 80053 COMPREHEN METABOLIC PANEL: CPT

## 2018-11-10 PROCEDURE — 84100 ASSAY OF PHOSPHORUS: CPT

## 2018-11-10 PROCEDURE — 85610 PROTHROMBIN TIME: CPT

## 2018-11-10 PROCEDURE — 83735 ASSAY OF MAGNESIUM: CPT

## 2018-11-10 PROCEDURE — 80053 COMPREHEN METABOLIC PANEL: CPT | Mod: 91

## 2018-11-10 PROCEDURE — 85025 COMPLETE CBC W/AUTO DIFF WBC: CPT

## 2018-11-10 PROCEDURE — 36415 COLL VENOUS BLD VENIPUNCTURE: CPT

## 2018-11-10 RX ORDER — POLYETHYLENE GLYCOL 3350 17 G/17G
17 POWDER, FOR SOLUTION ORAL DAILY
Status: DISCONTINUED | OUTPATIENT
Start: 2018-11-11 | End: 2018-11-10

## 2018-11-10 RX ORDER — FUROSEMIDE 40 MG/1
40 TABLET ORAL DAILY
Status: DISCONTINUED | OUTPATIENT
Start: 2018-11-10 | End: 2018-11-10

## 2018-11-10 RX ORDER — POLYETHYLENE GLYCOL 3350 17 G/17G
17 POWDER, FOR SOLUTION ORAL ONCE
Status: COMPLETED | OUTPATIENT
Start: 2018-11-10 | End: 2018-11-10

## 2018-11-10 RX ORDER — CARVEDILOL 12.5 MG/1
12.5 TABLET ORAL 2 TIMES DAILY
Status: DISCONTINUED | OUTPATIENT
Start: 2018-11-10 | End: 2018-11-11

## 2018-11-10 RX ADMIN — CARVEDILOL 12.5 MG: 12.5 TABLET, FILM COATED ORAL at 08:11

## 2018-11-10 RX ADMIN — POLYETHYLENE GLYCOL 3350 17 G: 17 POWDER, FOR SOLUTION ORAL at 08:11

## 2018-11-10 RX ADMIN — VALSARTAN 20 MG: 40 TABLET, FILM COATED ORAL at 08:11

## 2018-11-10 RX ADMIN — ASPIRIN 81 MG CHEWABLE TABLET 81 MG: 81 TABLET CHEWABLE at 08:11

## 2018-11-10 RX ADMIN — WARFARIN SODIUM 2.5 MG: 2.5 TABLET ORAL at 04:11

## 2018-11-10 RX ADMIN — CARVEDILOL 6.25 MG: 6.25 TABLET, FILM COATED ORAL at 08:11

## 2018-11-10 RX ADMIN — FUROSEMIDE 40 MG: 40 TABLET ORAL at 08:11

## 2018-11-10 RX ADMIN — EPLERENONE 25 MG: 25 TABLET ORAL at 08:11

## 2018-11-10 RX ADMIN — LEVOTHYROXINE SODIUM 75 MCG: 75 TABLET ORAL at 08:11

## 2018-11-10 RX ADMIN — ATORVASTATIN CALCIUM 80 MG: 20 TABLET, FILM COATED ORAL at 08:11

## 2018-11-10 RX ADMIN — PAROXETINE HYDROCHLORIDE 20 MG: 20 TABLET, FILM COATED ORAL at 08:11

## 2018-11-10 RX ADMIN — DIGOXIN 125 MCG: 125 TABLET ORAL at 08:11

## 2018-11-10 RX ADMIN — AMIODARONE HYDROCHLORIDE 200 MG: 200 TABLET ORAL at 08:11

## 2018-11-10 NOTE — PROGRESS NOTES
[facility]  Cardiology  Progress Note    Patient Name: Donavan Mathews  MRN: [id number]  Admission Date: 11/7/2018  Hospital Length of Stay: 3 days  Code Status: Full Code   Attending Physician: Binta Virk MD   Primary Care Physician: Tim Luis MD  Expected Discharge Date: 11/12/2018  Principal Problem:VT (ventricular tachycardia)    Subjective:     Hospital Course:   Patient admitted for recurrent pleomorphic VT sustaining several shocks from AICD, taken to EP lab for VT ablation. Patient came back from EP lab intubated and on pressors. If was felt that the LV lead was not capturing and needed LV lead revision and venogram. Patient came back from EP lab intubated and on pressors. Patient weaned off pressor support and extubated 11/8 in evening. Patient tolerating post procedurally.    Interval History: Today the patient reports feeling well. He feels better after receiving lasix yesterday. Does not report chest pain, edema or palpitations. No new concerns at this time.    Review of Systems   Constitution: Negative.   HENT: Negative.    Eyes: Negative.    Cardiovascular: Negative.    Respiratory: Negative.    Endocrine: Negative.    Hematologic/Lymphatic: Negative.    Skin: Negative.    Musculoskeletal: Negative.    Gastrointestinal: Negative.    Genitourinary: Negative.    Neurological: Negative.    Psychiatric/Behavioral: Negative.      Objective:     Vital Signs (Most Recent):  Temp: 98.3 °F (36.8 °C) (11/10/18 0829)  Pulse: 88 (11/10/18 0829)  Resp: 14 (11/10/18 0829)  BP: 98/69 (11/10/18 0829)  SpO2: (!) 91 % (11/10/18 0830) Vital Signs (24h Range):  Temp:  [97.9 °F (36.6 °C)-99.1 °F (37.3 °C)] 98.3 °F (36.8 °C)  Pulse:  [] 88  Resp:  [14-30] 14  SpO2:  [88 %-95 %] 91 %  BP: ()/(54-73) 98/69     Weight: 76.7 kg (169 lb)  Body mass index is 22.92 kg/m².     SpO2: (!) 91 %  O2 Device (Oxygen Therapy): room air      Intake/Output Summary (Last 24 hours) at 11/10/2018  0902  Last data filed at 11/10/2018 0000  Gross per 24 hour   Intake 480 ml   Output 865 ml   Net -385 ml       Lines/Drains/Airways     Peripheral Intravenous Line                 Peripheral IV - Single Lumen 11/07/18 1541 Left Antecubital 2 days         Peripheral IV - Single Lumen 11/07/18 1541 Left Wrist 2 days                Physical Exam   Constitutional: He is oriented to person, place, and time. He appears well-developed and well-nourished. No distress.   HENT:   Head: Normocephalic and atraumatic.   Neck: No JVD present.   Cardiovascular: Normal rate, regular rhythm, normal heart sounds and intact distal pulses. Exam reveals no gallop and no friction rub.   No murmur heard.  Right shoulder ICD pocket with ecchymoses   Pulmonary/Chest: Effort normal and breath sounds normal. No respiratory distress. He has no wheezes. He has no rales.   Abdominal: Soft. Bowel sounds are normal. He exhibits no distension. There is no tenderness.   Musculoskeletal: He exhibits no edema.   Neurological: He is alert and oriented to person, place, and time.   Skin: He is not diaphoretic.       Significant Labs:     Recent Results (from the past 24 hour(s))   Protime-INR    Collection Time: 11/09/18  9:33 AM   Result Value Ref Range    Prothrombin Time 27.0 (H) 9.0 - 12.5 sec    INR 2.8 (H) 0.8 - 1.2   Phosphorus    Collection Time: 11/09/18 10:06 AM   Result Value Ref Range    Phosphorus 3.0 2.7 - 4.5 mg/dL   Magnesium    Collection Time: 11/09/18 10:06 AM   Result Value Ref Range    Magnesium 2.2 1.6 - 2.6 mg/dL   Potassium    Collection Time: 11/09/18 10:06 AM   Result Value Ref Range    Potassium 3.9 3.5 - 5.1 mmol/L   Magnesium    Collection Time: 11/09/18 10:06 AM   Result Value Ref Range    Magnesium 2.2 1.6 - 2.6 mg/dL   Comprehensive metabolic panel    Collection Time: 11/09/18  4:52 PM   Result Value Ref Range    Sodium 137 136 - 145 mmol/L    Potassium 4.9 3.5 - 5.1 mmol/L    Chloride 102 95 - 110 mmol/L    CO2 27 23 -  29 mmol/L    Glucose 111 (H) 70 - 110 mg/dL    BUN, Bld 23 8 - 23 mg/dL    Creatinine 1.0 0.5 - 1.4 mg/dL    Calcium 8.6 (L) 8.7 - 10.5 mg/dL    Total Protein 6.4 6.0 - 8.4 g/dL    Albumin 2.8 (L) 3.5 - 5.2 g/dL    Total Bilirubin 1.6 (H) 0.1 - 1.0 mg/dL    Alkaline Phosphatase 98 55 - 135 U/L    AST 45 (H) 10 - 40 U/L    ALT 37 10 - 44 U/L    Anion Gap 8 8 - 16 mmol/L    eGFR if African American >60.0 >60 mL/min/1.73 m^2    eGFR if non African American >60.0 >60 mL/min/1.73 m^2   CBC auto differential    Collection Time: 11/10/18  4:14 AM   Result Value Ref Range    WBC 12.26 3.90 - 12.70 K/uL    RBC 4.40 (L) 4.60 - 6.20 M/uL    Hemoglobin 13.5 (L) 14.0 - 18.0 g/dL    Hematocrit 41.0 40.0 - 54.0 %    MCV 93 82 - 98 fL    MCH 30.7 27.0 - 31.0 pg    MCHC 32.9 32.0 - 36.0 g/dL    RDW 14.8 (H) 11.5 - 14.5 %    Platelets 132 (L) 150 - 350 K/uL    MPV 11.3 9.2 - 12.9 fL    Immature Granulocytes 0.5 0.0 - 0.5 %    Gran # (ANC) 10.0 (H) 1.8 - 7.7 K/uL    Immature Grans (Abs) 0.06 (H) 0.00 - 0.04 K/uL    Lymph # 0.9 (L) 1.0 - 4.8 K/uL    Mono # 1.3 (H) 0.3 - 1.0 K/uL    Eos # 0.1 0.0 - 0.5 K/uL    Baso # 0.01 0.00 - 0.20 K/uL    nRBC 0 0 /100 WBC    Gran% 81.4 (H) 38.0 - 73.0 %    Lymph% 6.9 (L) 18.0 - 48.0 %    Mono% 10.4 4.0 - 15.0 %    Eosinophil% 0.7 0.0 - 8.0 %    Basophil% 0.1 0.0 - 1.9 %    Differential Method Automated    Magnesium    Collection Time: 11/10/18  4:14 AM   Result Value Ref Range    Magnesium 2.3 1.6 - 2.6 mg/dL   Phosphorus    Collection Time: 11/10/18  4:14 AM   Result Value Ref Range    Phosphorus 2.4 (L) 2.7 - 4.5 mg/dL         Significant Imaging:         I have reviewed all pertinent imaging studies from the last 24 hours      Assessment and Plan:       * VT (ventricular tachycardia)    78 yr old man with PMH of persistent Afib, Mixed CM Ef 25% s/p Medtronic CRTD upgrade 1/17, S/p  Mechanical AVR in 1990, s/p 1v CABG(SVG to RCA) 1990,restrictive lung disease potentially associated to amiodarone  (per Dr Aguayo's note), recurrent pleomorphic VTs/p multiple defibrillator shock was referred to EP clinic by Dr Mehta for eval of VT.    -Taken for LV lead revision 11/8 with EP  -Continue dig, amiodarone  - increase carvedilol dose  -Electrolyte replacement PRN (K>4, Mg>2)     A-fib    Continuing home medications at this time:  -Amio 200 BID  -ASA 81mg QD  -Carvediolo 12.5 mg BID  -Dig 125 mcg QD      Cardiomyopathy    Mixed CMP <20% s/p CRT-D  Global hypokinesis worse in inferior and inferoseptal walls  Mildly dilated LV and RV    Reinitiated home meds: (with exception of Cardizem)  -ASA 81mg QD  -Carvediolo 6.25mg BID (will up titrate to 25)  - Eplerenone 25    Started:  -Valsartan with intent to transition to entresto        Current use of long term anticoagulation    -on warfarin   -trend levels daily and adjust for goal INR 2.0-3.0    Lab Results   Component Value Date    INR 2.8 (H) 11/09/2018    INR 1.9 (H) 11/08/2018    INR 3.0 (H) 11/07/2018          Hx of CABG    -see above        S/P AVR (aortic valve replacement)    -Pharmacy consult for warfarin dosing  -Continue warfarin, monitor INR         VTE Risk Mitigation (From admission, onward)        Ordered     warfarin (COUMADIN) tablet 2.5 mg  Daily      11/08/18 1354          Nehemiah Perez MD  Cardiology  [facility]

## 2018-11-10 NOTE — SUBJECTIVE & OBJECTIVE
Interval History: Today the patient reports feeling well. He feels better after receiving lasix yesterday. Does not report chest pain, edema or palpitations. No new concerns at this time.    Review of Systems   Constitution: Negative.   HENT: Negative.    Eyes: Negative.    Cardiovascular: Negative.    Respiratory: Negative.    Endocrine: Negative.    Hematologic/Lymphatic: Negative.    Skin: Negative.    Musculoskeletal: Negative.    Gastrointestinal: Negative.    Genitourinary: Negative.    Neurological: Negative.    Psychiatric/Behavioral: Negative.      Objective:     Vital Signs (Most Recent):  Temp: 98.3 °F (36.8 °C) (11/10/18 0829)  Pulse: 88 (11/10/18 0829)  Resp: 14 (11/10/18 0829)  BP: 98/69 (11/10/18 0829)  SpO2: (!) 91 % (11/10/18 0830) Vital Signs (24h Range):  Temp:  [97.9 °F (36.6 °C)-99.1 °F (37.3 °C)] 98.3 °F (36.8 °C)  Pulse:  [] 88  Resp:  [14-30] 14  SpO2:  [88 %-95 %] 91 %  BP: ()/(54-73) 98/69     Weight: 76.7 kg (169 lb)  Body mass index is 22.92 kg/m².     SpO2: (!) 91 %  O2 Device (Oxygen Therapy): room air      Intake/Output Summary (Last 24 hours) at 11/10/2018 0902  Last data filed at 11/10/2018 0000  Gross per 24 hour   Intake 480 ml   Output 865 ml   Net -385 ml       Lines/Drains/Airways     Peripheral Intravenous Line                 Peripheral IV - Single Lumen 11/07/18 1541 Left Antecubital 2 days         Peripheral IV - Single Lumen 11/07/18 1541 Left Wrist 2 days                Physical Exam   Constitutional: He is oriented to person, place, and time. He appears well-developed and well-nourished. No distress.   HENT:   Head: Normocephalic and atraumatic.   Neck: No JVD present.   Cardiovascular: Normal rate, regular rhythm, normal heart sounds and intact distal pulses. Exam reveals no gallop and no friction rub.   No murmur heard.  Right shoulder ICD pocket with ecchymoses   Pulmonary/Chest: Effort normal and breath sounds normal. No respiratory distress. He has no  wheezes. He has no rales.   Abdominal: Soft. Bowel sounds are normal. He exhibits no distension. There is no tenderness.   Musculoskeletal: He exhibits no edema.   Neurological: He is alert and oriented to person, place, and time.   Skin: He is not diaphoretic.       Significant Labs:     Recent Results (from the past 24 hour(s))   Protime-INR    Collection Time: 11/09/18  9:33 AM   Result Value Ref Range    Prothrombin Time 27.0 (H) 9.0 - 12.5 sec    INR 2.8 (H) 0.8 - 1.2   Phosphorus    Collection Time: 11/09/18 10:06 AM   Result Value Ref Range    Phosphorus 3.0 2.7 - 4.5 mg/dL   Magnesium    Collection Time: 11/09/18 10:06 AM   Result Value Ref Range    Magnesium 2.2 1.6 - 2.6 mg/dL   Potassium    Collection Time: 11/09/18 10:06 AM   Result Value Ref Range    Potassium 3.9 3.5 - 5.1 mmol/L   Magnesium    Collection Time: 11/09/18 10:06 AM   Result Value Ref Range    Magnesium 2.2 1.6 - 2.6 mg/dL   Comprehensive metabolic panel    Collection Time: 11/09/18  4:52 PM   Result Value Ref Range    Sodium 137 136 - 145 mmol/L    Potassium 4.9 3.5 - 5.1 mmol/L    Chloride 102 95 - 110 mmol/L    CO2 27 23 - 29 mmol/L    Glucose 111 (H) 70 - 110 mg/dL    BUN, Bld 23 8 - 23 mg/dL    Creatinine 1.0 0.5 - 1.4 mg/dL    Calcium 8.6 (L) 8.7 - 10.5 mg/dL    Total Protein 6.4 6.0 - 8.4 g/dL    Albumin 2.8 (L) 3.5 - 5.2 g/dL    Total Bilirubin 1.6 (H) 0.1 - 1.0 mg/dL    Alkaline Phosphatase 98 55 - 135 U/L    AST 45 (H) 10 - 40 U/L    ALT 37 10 - 44 U/L    Anion Gap 8 8 - 16 mmol/L    eGFR if African American >60.0 >60 mL/min/1.73 m^2    eGFR if non African American >60.0 >60 mL/min/1.73 m^2   CBC auto differential    Collection Time: 11/10/18  4:14 AM   Result Value Ref Range    WBC 12.26 3.90 - 12.70 K/uL    RBC 4.40 (L) 4.60 - 6.20 M/uL    Hemoglobin 13.5 (L) 14.0 - 18.0 g/dL    Hematocrit 41.0 40.0 - 54.0 %    MCV 93 82 - 98 fL    MCH 30.7 27.0 - 31.0 pg    MCHC 32.9 32.0 - 36.0 g/dL    RDW 14.8 (H) 11.5 - 14.5 %    Platelets  132 (L) 150 - 350 K/uL    MPV 11.3 9.2 - 12.9 fL    Immature Granulocytes 0.5 0.0 - 0.5 %    Gran # (ANC) 10.0 (H) 1.8 - 7.7 K/uL    Immature Grans (Abs) 0.06 (H) 0.00 - 0.04 K/uL    Lymph # 0.9 (L) 1.0 - 4.8 K/uL    Mono # 1.3 (H) 0.3 - 1.0 K/uL    Eos # 0.1 0.0 - 0.5 K/uL    Baso # 0.01 0.00 - 0.20 K/uL    nRBC 0 0 /100 WBC    Gran% 81.4 (H) 38.0 - 73.0 %    Lymph% 6.9 (L) 18.0 - 48.0 %    Mono% 10.4 4.0 - 15.0 %    Eosinophil% 0.7 0.0 - 8.0 %    Basophil% 0.1 0.0 - 1.9 %    Differential Method Automated    Magnesium    Collection Time: 11/10/18  4:14 AM   Result Value Ref Range    Magnesium 2.3 1.6 - 2.6 mg/dL   Phosphorus    Collection Time: 11/10/18  4:14 AM   Result Value Ref Range    Phosphorus 2.4 (L) 2.7 - 4.5 mg/dL         Significant Imaging:         I have reviewed all pertinent imaging studies from the last 24 hours

## 2018-11-10 NOTE — SUBJECTIVE & OBJECTIVE
Interval History: No events over night. Denies cardiovascular symptoms (angina, dyspnea, palpitations, syncope).  Telemetry: multiple episodes of NS VT, some episodes w different morphology between each other, alternating the axis      Review of Systems   HENT: Negative for congestion and ear discharge.    Eyes: Negative for blurred vision and discharge.   Cardiovascular: Negative for chest pain and claudication.   Respiratory: Negative for cough and hemoptysis.    Endocrine: Negative for cold intolerance and heat intolerance.     Objective:     Vital Signs (Most Recent):  Temp: 98.3 °F (36.8 °C) (11/09/18 1537)  Pulse: 86 (11/09/18 1537)  Resp: 17 (11/09/18 1537)  BP: (!) 96/59 (11/09/18 1537)  SpO2: (!) 90 % (11/09/18 1537) Vital Signs (24h Range):  Temp:  [97.9 °F (36.6 °C)-99.1 °F (37.3 °C)] 98.3 °F (36.8 °C)  Pulse:  [] 86  Resp:  [17-37] 17  SpO2:  [90 %-98 %] 90 %  BP: ()/(54-59) 96/59  Arterial Line BP: ()/(48-69) 118/56     Weight: 76.7 kg (169 lb)  Body mass index is 22.92 kg/m².     SpO2: (!) 90 %  O2 Device (Oxygen Therapy): nasal cannula    Physical Exam   Constitutional: He is oriented to person, place, and time. He appears well-developed and well-nourished.   HENT:   Head: Normocephalic and atraumatic.   Nose: Nose normal.   Mouth/Throat: No oropharyngeal exudate.   Eyes: Right eye exhibits no discharge. Left eye exhibits no discharge. No scleral icterus.   Neck: Normal range of motion. Neck supple. No JVD present.   Cardiovascular: Normal rate, regular rhythm, S1 normal and S2 normal. Exam reveals no gallop, no S3, no S4, no distant heart sounds, no friction rub, no midsystolic click and no opening snap.   No murmur heard.  Pulses:       Radial pulses are 2+ on the right side, and 2+ on the left side.        Femoral pulses are 2+ on the right side, and 2+ on the left side.  Pulmonary/Chest: Effort normal and breath sounds normal. No respiratory distress. He has no wheezes. He has no  rales. He exhibits no tenderness.   Abdominal: Soft. Bowel sounds are normal. He exhibits no distension. There is no tenderness. There is no rebound.   Musculoskeletal: Normal range of motion. He exhibits no edema, tenderness or deformity.   Lymphadenopathy:     He has no cervical adenopathy.   Neurological: He is alert and oriented to person, place, and time. No cranial nerve deficit.   Skin: Skin is warm and dry.   Psychiatric: He has a normal mood and affect. His behavior is normal.       Significant Labs:   BMP:   Recent Labs   Lab 11/08/18 2150 11/09/18 0234 11/09/18  1006 11/09/18  1652   * 106  --  111*    141  --  137   K 3.8 3.5 3.9 4.9    107  --  102   CO2 25 23  --  27   BUN 19 18  --  23   CREATININE 1.0 1.0  --  1.0   CALCIUM 8.4* 8.0*  --  8.6*   MG 2.1 2.0 2.2  2.2  --    , CMP:   Recent Labs   Lab 11/08/18  0303 11/08/18  2150 11/09/18  0234 11/09/18  1006 11/09/18  1652    141 141  --  137   K 3.6 3.8 3.5 3.9 4.9    106 107  --  102   CO2 25 25 23  --  27    116* 106  --  111*   BUN 21 19 18  --  23   CREATININE 1.1 1.0 1.0  --  1.0   CALCIUM 8.1* 8.4* 8.0*  --  8.6*   PROT 6.7  --  5.9*  --  6.4   ALBUMIN 3.0*  --  2.8*  --  2.8*   BILITOT 0.9  --  1.3*  --  1.6*   ALKPHOS 91  --  88  --  98   AST 95*  --  55*  --  45*   ALT 59*  --  41  --  37   ANIONGAP 10 10 11  --  8   ESTGFRAFRICA >60.0 >60.0 >60.0  --  >60.0   EGFRNONAA >60.0 >60.0 >60.0  --  >60.0    and CBC:   Recent Labs   Lab 11/08/18  0303 11/09/18  0234   WBC 12.53 14.64*   HGB 13.0* 12.7*   HCT 38.6* 39.9*   * 141*       Significant Imaging: Telemetry, device interrogation.

## 2018-11-10 NOTE — PROGRESS NOTES
[facility]  Cardiac Electrophysiology  Progress Note    Admission Date: 11/7/2018  Code Status: Full Code   Attending Physician: Binta Virk MD   Expected Discharge Date: 11/12/2018  Principal Problem:VT (ventricular tachycardia)    Subjective:     Interval History: No events over night. Denies cardiovascular symptoms (angina, dyspnea, palpitations, syncope).  Telemetry: multiple episodes of NS VT, some episodes w different morphology between each other, alternating the axis  Device interrogation: Afib with multiple ectopic ventricular beats.      Review of Systems   HENT: Negative for congestion and ear discharge.    Eyes: Negative for blurred vision and discharge.   Cardiovascular: Negative for chest pain and claudication.   Respiratory: Negative for cough and hemoptysis.    Endocrine: Negative for cold intolerance and heat intolerance.     Objective:     Vital Signs (Most Recent):  Temp: 98.3 °F (36.8 °C) (11/09/18 1537)  Pulse: 86 (11/09/18 1537)  Resp: 17 (11/09/18 1537)  BP: (!) 96/59 (11/09/18 1537)  SpO2: (!) 90 % (11/09/18 1537) Vital Signs (24h Range):  Temp:  [97.9 °F (36.6 °C)-99.1 °F (37.3 °C)] 98.3 °F (36.8 °C)  Pulse:  [] 86  Resp:  [17-37] 17  SpO2:  [90 %-98 %] 90 %  BP: ()/(54-59) 96/59  Arterial Line BP: ()/(48-69) 118/56     Weight: 76.7 kg (169 lb)  Body mass index is 22.92 kg/m².     SpO2: (!) 90 %  O2 Device (Oxygen Therapy): nasal cannula    Physical Exam   Constitutional: He is oriented to person, place, and time. He appears well-developed and well-nourished.   HENT:   Head: Normocephalic and atraumatic.   Nose: Nose normal.   Mouth/Throat: No oropharyngeal exudate.   Eyes: Right eye exhibits no discharge. Left eye exhibits no discharge. No scleral icterus.   Neck: Normal range of motion. Neck supple. No JVD present.   Cardiovascular: Normal rate, regular rhythm, S1 normal and S2 normal. Exam reveals no gallop, no S3, no S4, no distant heart  sounds, no friction rub, no midsystolic click and no opening snap.   No murmur heard.  Pulses:       Radial pulses are 2+ on the right side, and 2+ on the left side.        Femoral pulses are 2+ on the right side, and 2+ on the left side.  Pulmonary/Chest: Effort normal and breath sounds normal. No respiratory distress. He has no wheezes. He has no rales. He exhibits no tenderness.   Abdominal: Soft. Bowel sounds are normal. He exhibits no distension. There is no tenderness. There is no rebound.   Musculoskeletal: Normal range of motion. He exhibits no edema, tenderness or deformity.   Lymphadenopathy:     He has no cervical adenopathy.   Neurological: He is alert and oriented to person, place, and time. No cranial nerve deficit.   Skin: Skin is warm and dry.   Psychiatric: He has a normal mood and affect. His behavior is normal.       Significant Labs:   BMP:   Recent Labs   Lab 11/08/18  2150 11/09/18  0234 11/09/18  1006 11/09/18  1652   * 106  --  111*    141  --  137   K 3.8 3.5 3.9 4.9    107  --  102   CO2 25 23  --  27   BUN 19 18  --  23   CREATININE 1.0 1.0  --  1.0   CALCIUM 8.4* 8.0*  --  8.6*   MG 2.1 2.0 2.2  2.2  --    , CMP:   Recent Labs   Lab 11/08/18  0303 11/08/18  2150 11/09/18  0234 11/09/18  1006 11/09/18  1652    141 141  --  137   K 3.6 3.8 3.5 3.9 4.9    106 107  --  102   CO2 25 25 23  --  27    116* 106  --  111*   BUN 21 19 18  --  23   CREATININE 1.1 1.0 1.0  --  1.0   CALCIUM 8.1* 8.4* 8.0*  --  8.6*   PROT 6.7  --  5.9*  --  6.4   ALBUMIN 3.0*  --  2.8*  --  2.8*   BILITOT 0.9  --  1.3*  --  1.6*   ALKPHOS 91  --  88  --  98   AST 95*  --  55*  --  45*   ALT 59*  --  41  --  37   ANIONGAP 10 10 11  --  8   ESTGFRAFRICA >60.0 >60.0 >60.0  --  >60.0   EGFRNONAA >60.0 >60.0 >60.0  --  >60.0    and CBC:   Recent Labs   Lab 11/08/18  0303 11/09/18  0234   WBC 12.53 14.64*   HGB 13.0* 12.7*   HCT 38.6* 39.9*   * 141*       Significant Imaging:  Telemetry, device interrogation.    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     Post procedure Xray w/o pneumothorax (abdominal Xray with extension into chest)  Consent for LV lead revision left on chart     F/U EP recs:  -Continue dig, amiodarone  -Try to increase carvedilol to 12.5 if his BP tolerates it, otherwise will need to switch to metoprolol to help to suppress the ectopic ventricular foci  -Avoid CCB due to depressed LVEF  -We performed manual compression on the device pocket due to hematoma and changed dressing    Case discussed w Dr Harvinder Orellana MD  Cardiac Electrophysiology  [facility]

## 2018-11-10 NOTE — PLAN OF CARE
Problem: Patient Care Overview  Goal: Plan of Care Review  Outcome: Ongoing (interventions implemented as appropriate)  Patient free from falls and injuries throughout shift.  AAO and VSS.  Patient denies chest pain and SOB.   Patient continues on digoxin 125 mcg, lasix 40 mg PO, and amiodarone 200 mg.  Compression on device.  Sling in place; arm remains immobilized.   K+ 4.9, Bun/Cre 23/1.0, INR 2.8.  No acute events overnight. Patient resting well at this time.  Plan of care discussed with patient.  Patient verbalizes understanding.  Will continue to monitor.

## 2018-11-10 NOTE — PLAN OF CARE
Problem: Patient Care Overview  Goal: Plan of Care Review  Outcome: Ongoing (interventions implemented as appropriate)  Reviewed plan of care, verbalized understanding  Answered some questions  Pt remained free from falls  Stable VS and tele  Pain controlled, pt prefers tylenol when necessary  Ice on right shoulder for discomfort from procedure  Arm immobilization on pt  Bilateral groin sites free from hematoma, and dressings CDI  Will continue to monitior  D/C pending       Lloyd Huber

## 2018-11-11 LAB
ALBUMIN SERPL BCP-MCNC: 2.5 G/DL
ALBUMIN SERPL BCP-MCNC: 2.6 G/DL
ALP SERPL-CCNC: 77 U/L
ALP SERPL-CCNC: 84 U/L
ALT SERPL W/O P-5'-P-CCNC: 26 U/L
ALT SERPL W/O P-5'-P-CCNC: 28 U/L
ANION GAP SERPL CALC-SCNC: 10 MMOL/L
ANION GAP SERPL CALC-SCNC: 10 MMOL/L
ANION GAP SERPL CALC-SCNC: 5 MMOL/L
AST SERPL-CCNC: 31 U/L
AST SERPL-CCNC: 33 U/L
BASOPHILS # BLD AUTO: 0.02 K/UL
BASOPHILS NFR BLD: 0.2 %
BILIRUB SERPL-MCNC: 1 MG/DL
BILIRUB SERPL-MCNC: 1.3 MG/DL
BUN SERPL-MCNC: 21 MG/DL
BUN SERPL-MCNC: 21 MG/DL
BUN SERPL-MCNC: 23 MG/DL
CALCIUM SERPL-MCNC: 8.6 MG/DL
CALCIUM SERPL-MCNC: 8.6 MG/DL
CALCIUM SERPL-MCNC: 8.9 MG/DL
CHLORIDE SERPL-SCNC: 100 MMOL/L
CHLORIDE SERPL-SCNC: 101 MMOL/L
CHLORIDE SERPL-SCNC: 103 MMOL/L
CO2 SERPL-SCNC: 25 MMOL/L
CO2 SERPL-SCNC: 26 MMOL/L
CO2 SERPL-SCNC: 26 MMOL/L
CREAT SERPL-MCNC: 0.9 MG/DL
CREAT SERPL-MCNC: 0.9 MG/DL
CREAT SERPL-MCNC: 1.1 MG/DL
DIFFERENTIAL METHOD: ABNORMAL
EOSINOPHIL # BLD AUTO: 0.1 K/UL
EOSINOPHIL NFR BLD: 1.2 %
ERYTHROCYTE [DISTWIDTH] IN BLOOD BY AUTOMATED COUNT: 14.7 %
EST. GFR  (AFRICAN AMERICAN): >60 ML/MIN/1.73 M^2
EST. GFR  (NON AFRICAN AMERICAN): >60 ML/MIN/1.73 M^2
GLUCOSE SERPL-MCNC: 104 MG/DL
GLUCOSE SERPL-MCNC: 106 MG/DL
GLUCOSE SERPL-MCNC: 138 MG/DL
HCT VFR BLD AUTO: 37.3 %
HGB BLD-MCNC: 12.1 G/DL
IMM GRANULOCYTES # BLD AUTO: 0.03 K/UL
IMM GRANULOCYTES NFR BLD AUTO: 0.3 %
INR PPP: 2.9
LYMPHOCYTES # BLD AUTO: 0.7 K/UL
LYMPHOCYTES NFR BLD: 7.7 %
MAGNESIUM SERPL-MCNC: 2.1 MG/DL
MCH RBC QN AUTO: 30.9 PG
MCHC RBC AUTO-ENTMCNC: 32.4 G/DL
MCV RBC AUTO: 95 FL
MONOCYTES # BLD AUTO: 0.9 K/UL
MONOCYTES NFR BLD: 9.5 %
NEUTROPHILS # BLD AUTO: 7.7 K/UL
NEUTROPHILS NFR BLD: 81.1 %
NRBC BLD-RTO: 0 /100 WBC
PHOSPHATE SERPL-MCNC: 3.1 MG/DL
PLATELET # BLD AUTO: 132 K/UL
PMV BLD AUTO: 12 FL
POCT GLUCOSE: 110 MG/DL (ref 70–110)
POTASSIUM SERPL-SCNC: 4.2 MMOL/L
POTASSIUM SERPL-SCNC: 4.3 MMOL/L
POTASSIUM SERPL-SCNC: 4.3 MMOL/L
PROT SERPL-MCNC: 5.9 G/DL
PROT SERPL-MCNC: 6 G/DL
PROTHROMBIN TIME: 28.1 SEC
RBC # BLD AUTO: 3.92 M/UL
SODIUM SERPL-SCNC: 134 MMOL/L
SODIUM SERPL-SCNC: 135 MMOL/L
SODIUM SERPL-SCNC: 137 MMOL/L
WBC # BLD AUTO: 9.55 K/UL

## 2018-11-11 PROCEDURE — 25000003 PHARM REV CODE 250: Performed by: STUDENT IN AN ORGANIZED HEALTH CARE EDUCATION/TRAINING PROGRAM

## 2018-11-11 PROCEDURE — 97116 GAIT TRAINING THERAPY: CPT

## 2018-11-11 PROCEDURE — 84100 ASSAY OF PHOSPHORUS: CPT

## 2018-11-11 PROCEDURE — 99231 SBSQ HOSP IP/OBS SF/LOW 25: CPT | Mod: GC,,, | Performed by: INTERNAL MEDICINE

## 2018-11-11 PROCEDURE — 25000003 PHARM REV CODE 250: Performed by: INTERNAL MEDICINE

## 2018-11-11 PROCEDURE — G8979 MOBILITY GOAL STATUS: HCPCS | Mod: CI

## 2018-11-11 PROCEDURE — 85025 COMPLETE CBC W/AUTO DIFF WBC: CPT

## 2018-11-11 PROCEDURE — 80053 COMPREHEN METABOLIC PANEL: CPT

## 2018-11-11 PROCEDURE — G8980 MOBILITY D/C STATUS: HCPCS | Mod: CK

## 2018-11-11 PROCEDURE — 80048 BASIC METABOLIC PNL TOTAL CA: CPT

## 2018-11-11 PROCEDURE — 20600001 HC STEP DOWN PRIVATE ROOM

## 2018-11-11 PROCEDURE — 85610 PROTHROMBIN TIME: CPT

## 2018-11-11 PROCEDURE — 93010 ELECTROCARDIOGRAM REPORT: CPT | Mod: ,,, | Performed by: INTERNAL MEDICINE

## 2018-11-11 PROCEDURE — G8978 MOBILITY CURRENT STATUS: HCPCS | Mod: CK

## 2018-11-11 PROCEDURE — 97161 PT EVAL LOW COMPLEX 20 MIN: CPT

## 2018-11-11 PROCEDURE — 63600175 PHARM REV CODE 636 W HCPCS: Performed by: STUDENT IN AN ORGANIZED HEALTH CARE EDUCATION/TRAINING PROGRAM

## 2018-11-11 PROCEDURE — 93005 ELECTROCARDIOGRAM TRACING: CPT

## 2018-11-11 PROCEDURE — 36415 COLL VENOUS BLD VENIPUNCTURE: CPT

## 2018-11-11 PROCEDURE — 83735 ASSAY OF MAGNESIUM: CPT

## 2018-11-11 RX ORDER — CEFAZOLIN SODIUM 1 G/3ML
2 INJECTION, POWDER, FOR SOLUTION INTRAMUSCULAR; INTRAVENOUS
Status: COMPLETED | OUTPATIENT
Start: 2018-11-11 | End: 2018-11-11

## 2018-11-11 RX ORDER — CEFADROXIL 500 MG/1
500 CAPSULE ORAL EVERY 12 HOURS
Status: DISCONTINUED | OUTPATIENT
Start: 2018-11-12 | End: 2018-11-13 | Stop reason: HOSPADM

## 2018-11-11 RX ORDER — POLYETHYLENE GLYCOL 3350 17 G/17G
34 POWDER, FOR SOLUTION ORAL 2 TIMES DAILY PRN
Status: DISCONTINUED | OUTPATIENT
Start: 2018-11-11 | End: 2018-11-13 | Stop reason: HOSPADM

## 2018-11-11 RX ORDER — CARVEDILOL 12.5 MG/1
12.5 TABLET ORAL 2 TIMES DAILY
Status: DISCONTINUED | OUTPATIENT
Start: 2018-11-11 | End: 2018-11-11

## 2018-11-11 RX ORDER — METOPROLOL SUCCINATE 25 MG/1
50 TABLET, EXTENDED RELEASE ORAL 2 TIMES DAILY
Status: DISCONTINUED | OUTPATIENT
Start: 2018-11-11 | End: 2018-11-12

## 2018-11-11 RX ADMIN — LEVOTHYROXINE SODIUM 75 MCG: 75 TABLET ORAL at 10:11

## 2018-11-11 RX ADMIN — ATORVASTATIN CALCIUM 80 MG: 20 TABLET, FILM COATED ORAL at 08:11

## 2018-11-11 RX ADMIN — AMIODARONE HYDROCHLORIDE 200 MG: 200 TABLET ORAL at 08:11

## 2018-11-11 RX ADMIN — EPLERENONE 25 MG: 25 TABLET ORAL at 08:11

## 2018-11-11 RX ADMIN — POLYETHYLENE GLYCOL 3350 34 G: 17 POWDER, FOR SOLUTION ORAL at 08:11

## 2018-11-11 RX ADMIN — METOPROLOL SUCCINATE 50 MG: 25 TABLET, EXTENDED RELEASE ORAL at 08:11

## 2018-11-11 RX ADMIN — ACETAMINOPHEN 325 MG: 325 TABLET, FILM COATED ORAL at 08:11

## 2018-11-11 RX ADMIN — VALSARTAN 20 MG: 40 TABLET, FILM COATED ORAL at 08:11

## 2018-11-11 RX ADMIN — ASPIRIN 81 MG CHEWABLE TABLET 81 MG: 81 TABLET CHEWABLE at 08:11

## 2018-11-11 RX ADMIN — WARFARIN SODIUM 2.5 MG: 2.5 TABLET ORAL at 04:11

## 2018-11-11 RX ADMIN — CEFAZOLIN 2 G: 1 INJECTION, POWDER, FOR SOLUTION INTRAMUSCULAR; INTRAVENOUS at 07:11

## 2018-11-11 RX ADMIN — METOPROLOL SUCCINATE 50 MG: 25 TABLET, EXTENDED RELEASE ORAL at 10:11

## 2018-11-11 RX ADMIN — FUROSEMIDE 40 MG: 40 TABLET ORAL at 08:11

## 2018-11-11 RX ADMIN — VALSARTAN 20 MG: 40 TABLET, FILM COATED ORAL at 12:11

## 2018-11-11 RX ADMIN — CEFAZOLIN 2 G: 1 INJECTION, POWDER, FOR SOLUTION INTRAMUSCULAR; INTRAVENOUS at 10:11

## 2018-11-11 RX ADMIN — PAROXETINE HYDROCHLORIDE 20 MG: 20 TABLET, FILM COATED ORAL at 08:11

## 2018-11-11 RX ADMIN — DIGOXIN 125 MCG: 125 TABLET ORAL at 08:11

## 2018-11-11 NOTE — SUBJECTIVE & OBJECTIVE
Interval History: No events over night. Denies cardiovascular symptoms (angina, dyspnea, palpitations, syncope).  Today AM: he had an episode of regular monomorphic wide complex tachycardia w HR up to 170s for 13 min after doing incentive inspirometry  Interrogation of CRT-D: The episode was due to Afib w RVR      Review of Systems   HENT: Negative for congestion and ear discharge.    Eyes: Negative for blurred vision and discharge.   Cardiovascular: Negative for chest pain and claudication.   Respiratory: Negative for cough and hemoptysis.    Endocrine: Negative for cold intolerance and heat intolerance.     Objective:     Vital Signs (Most Recent):  Temp: 97.8 °F (36.6 °C) (11/11/18 0745)  Pulse: 79 (11/11/18 1100)  Resp: 18 (11/11/18 0745)  BP: 101/60 (11/11/18 0822)  SpO2: (!) 94 % (11/11/18 0822) Vital Signs (24h Range):  Temp:  [97.1 °F (36.2 °C)-98.4 °F (36.9 °C)] 97.8 °F (36.6 °C)  Pulse:  [] 79  Resp:  [16-18] 18  SpO2:  [90 %-96 %] 94 %  BP: ()/(53-74) 101/60     Weight: 76.7 kg (169 lb)  Body mass index is 22.92 kg/m².     SpO2: (!) 94 %  O2 Device (Oxygen Therapy): nasal cannula    Physical Exam   Constitutional: He is oriented to person, place, and time. He appears well-developed and well-nourished.   HENT:   Head: Normocephalic and atraumatic.   Nose: Nose normal.   Mouth/Throat: No oropharyngeal exudate.   Eyes: Right eye exhibits no discharge. Left eye exhibits no discharge. No scleral icterus.   Neck: Normal range of motion. Neck supple. No JVD present.   Cardiovascular: Normal rate, regular rhythm, S1 normal and S2 normal. Exam reveals no gallop, no S3, no S4, no distant heart sounds, no friction rub, no midsystolic click and no opening snap.   No murmur heard.  Pulses:       Radial pulses are 2+ on the right side, and 2+ on the left side.        Femoral pulses are 2+ on the right side, and 2+ on the left side.  Pulmonary/Chest: Effort normal and breath sounds normal. No respiratory  distress. He has no wheezes. He has no rales. He exhibits no tenderness.   Abdominal: Soft. Bowel sounds are normal. He exhibits no distension. There is no tenderness. There is no rebound.   Musculoskeletal: Normal range of motion. He exhibits no edema, tenderness or deformity.   Lymphadenopathy:     He has no cervical adenopathy.   Neurological: He is alert and oriented to person, place, and time. No cranial nerve deficit.   Skin: Skin is warm and dry.   Psychiatric: He has a normal mood and affect. His behavior is normal.       Significant Labs:   Blood Culture: No results for input(s): LABBLOO in the last 48 hours., BMP:   Recent Labs   Lab 11/10/18  0414  11/10/18  1235 11/10/18  1728 11/11/18  0338 11/11/18  0705   GLU  --    < > 157* 140*  --  104   NA  --    < > 139 138  --  134*   K  --    < > 4.1 4.0  --  4.2   CL  --    < > 101 100  --  103   CO2  --    < > 28 27  --  26   BUN  --    < > 26* 25*  --  21   CREATININE  --    < > 1.1 1.0  --  0.9   CALCIUM  --    < > 9.1 8.9  --  8.6*   MG 2.3  --   --   --  2.1  --     < > = values in this interval not displayed.   , CMP:   Recent Labs   Lab 11/10/18  1235 11/10/18  1728 11/11/18  0705    138 134*   K 4.1 4.0 4.2    100 103   CO2 28 27 26   * 140* 104   BUN 26* 25* 21   CREATININE 1.1 1.0 0.9   CALCIUM 9.1 8.9 8.6*   PROT 6.6 6.6 6.0   ALBUMIN 2.8* 2.8* 2.6*   BILITOT 1.5* 1.2* 1.3*   ALKPHOS 95 90 77   AST 36 35 33   ALT 31 31 28   ANIONGAP 10 11 5*   ESTGFRAFRICA >60.0 >60.0 >60.0   EGFRNONAA >60.0 >60.0 >60.0    and INR:   Recent Labs   Lab 11/10/18  0910 11/11/18  0338   INR 2.7* 2.9*       Significant Imaging: Telemetry, CRT-D EGM

## 2018-11-11 NOTE — SUBJECTIVE & OBJECTIVE
Interval History: Patient continues to spike fevers, broaden ABx. WCTM      Review of Systems   Constitution: Negative.   HENT: Negative.    Eyes: Negative.    Cardiovascular: Positive for irregular heartbeat. Negative for chest pain.   Respiratory: Negative for shortness of breath (improving now that sitting up).    Endocrine: Negative.    Hematologic/Lymphatic: Negative.    Skin: Negative.    Musculoskeletal:        Chest wall pain on right, improves with ice    Gastrointestinal: Negative.    Genitourinary: Negative.    Neurological: Negative.    Psychiatric/Behavioral: Negative.      Objective:     Vital Signs (Most Recent):  Temp: 97.8 °F (36.6 °C) (11/11/18 0745)  Pulse: 66 (11/11/18 0751)  Resp: 18 (11/11/18 0745)  BP: 101/60 (11/11/18 0822)  SpO2: (!) 94 % (11/11/18 0822) Vital Signs (24h Range):  Temp:  [97.1 °F (36.2 °C)-98.4 °F (36.9 °C)] 97.8 °F (36.6 °C)  Pulse:  [] 66  Resp:  [16-18] 18  SpO2:  [90 %-96 %] 94 %  BP: ()/(53-74) 101/60     Weight: 76.7 kg (169 lb)  Body mass index is 22.92 kg/m².     SpO2: (!) 94 %  O2 Device (Oxygen Therapy): nasal cannula      Intake/Output Summary (Last 24 hours) at 11/11/2018 1024  Last data filed at 11/11/2018 0800  Gross per 24 hour   Intake 1200 ml   Output 1475 ml   Net -275 ml       Lines/Drains/Airways     Peripheral Intravenous Line                 Peripheral IV - Single Lumen 11/07/18 1541 Left Antecubital 3 days         Peripheral IV - Single Lumen 11/07/18 1541 Left Wrist 3 days                Physical Exam   Constitutional: He appears well-developed and well-nourished. No distress.   HENT:   Head: Normocephalic and atraumatic.   Mouth/Throat: No oropharyngeal exudate.   Eyes: Pupils are equal, round, and reactive to light. No scleral icterus.   Neck: Normal range of motion. Neck supple. No JVD present.   Cardiovascular:   Murmur heard.  Frequent PVCs and non-sustained VT on tele    Pulmonary/Chest: Effort normal and breath sounds normal. No  respiratory distress.   Abdominal: Soft. Bowel sounds are normal. He exhibits no distension. There is no tenderness.   Musculoskeletal: Normal range of motion. He exhibits no edema.   Neurological: He is alert. No cranial nerve deficit.   Skin: Skin is warm and dry. He is not diaphoretic. No erythema.   Vitals reviewed.      Significant Labs:   Recent Results (from the past 24 hour(s))   Comprehensive metabolic panel    Collection Time: 11/10/18 12:35 PM   Result Value Ref Range    Sodium 139 136 - 145 mmol/L    Potassium 4.1 3.5 - 5.1 mmol/L    Chloride 101 95 - 110 mmol/L    CO2 28 23 - 29 mmol/L    Glucose 157 (H) 70 - 110 mg/dL    BUN, Bld 26 (H) 8 - 23 mg/dL    Creatinine 1.1 0.5 - 1.4 mg/dL    Calcium 9.1 8.7 - 10.5 mg/dL    Total Protein 6.6 6.0 - 8.4 g/dL    Albumin 2.8 (L) 3.5 - 5.2 g/dL    Total Bilirubin 1.5 (H) 0.1 - 1.0 mg/dL    Alkaline Phosphatase 95 55 - 135 U/L    AST 36 10 - 40 U/L    ALT 31 10 - 44 U/L    Anion Gap 10 8 - 16 mmol/L    eGFR if African American >60.0 >60 mL/min/1.73 m^2    eGFR if non African American >60.0 >60 mL/min/1.73 m^2   Comprehensive metabolic panel    Collection Time: 11/10/18  5:28 PM   Result Value Ref Range    Sodium 138 136 - 145 mmol/L    Potassium 4.0 3.5 - 5.1 mmol/L    Chloride 100 95 - 110 mmol/L    CO2 27 23 - 29 mmol/L    Glucose 140 (H) 70 - 110 mg/dL    BUN, Bld 25 (H) 8 - 23 mg/dL    Creatinine 1.0 0.5 - 1.4 mg/dL    Calcium 8.9 8.7 - 10.5 mg/dL    Total Protein 6.6 6.0 - 8.4 g/dL    Albumin 2.8 (L) 3.5 - 5.2 g/dL    Total Bilirubin 1.2 (H) 0.1 - 1.0 mg/dL    Alkaline Phosphatase 90 55 - 135 U/L    AST 35 10 - 40 U/L    ALT 31 10 - 44 U/L    Anion Gap 11 8 - 16 mmol/L    eGFR if African American >60.0 >60 mL/min/1.73 m^2    eGFR if non African American >60.0 >60 mL/min/1.73 m^2   CBC auto differential    Collection Time: 11/11/18  3:38 AM   Result Value Ref Range    WBC 9.55 3.90 - 12.70 K/uL    RBC 3.92 (L) 4.60 - 6.20 M/uL    Hemoglobin 12.1 (L) 14.0 - 18.0  g/dL    Hematocrit 37.3 (L) 40.0 - 54.0 %    MCV 95 82 - 98 fL    MCH 30.9 27.0 - 31.0 pg    MCHC 32.4 32.0 - 36.0 g/dL    RDW 14.7 (H) 11.5 - 14.5 %    Platelets 132 (L) 150 - 350 K/uL    MPV 12.0 9.2 - 12.9 fL    Immature Granulocytes 0.3 0.0 - 0.5 %    Gran # (ANC) 7.7 1.8 - 7.7 K/uL    Immature Grans (Abs) 0.03 0.00 - 0.04 K/uL    Lymph # 0.7 (L) 1.0 - 4.8 K/uL    Mono # 0.9 0.3 - 1.0 K/uL    Eos # 0.1 0.0 - 0.5 K/uL    Baso # 0.02 0.00 - 0.20 K/uL    nRBC 0 0 /100 WBC    Gran% 81.1 (H) 38.0 - 73.0 %    Lymph% 7.7 (L) 18.0 - 48.0 %    Mono% 9.5 4.0 - 15.0 %    Eosinophil% 1.2 0.0 - 8.0 %    Basophil% 0.2 0.0 - 1.9 %    Differential Method Automated    Magnesium    Collection Time: 11/11/18  3:38 AM   Result Value Ref Range    Magnesium 2.1 1.6 - 2.6 mg/dL   Phosphorus    Collection Time: 11/11/18  3:38 AM   Result Value Ref Range    Phosphorus 3.1 2.7 - 4.5 mg/dL   Protime-INR    Collection Time: 11/11/18  3:38 AM   Result Value Ref Range    Prothrombin Time 28.1 (H) 9.0 - 12.5 sec    INR 2.9 (H) 0.8 - 1.2   Comprehensive metabolic panel    Collection Time: 11/11/18  7:05 AM   Result Value Ref Range    Sodium 134 (L) 136 - 145 mmol/L    Potassium 4.2 3.5 - 5.1 mmol/L    Chloride 103 95 - 110 mmol/L    CO2 26 23 - 29 mmol/L    Glucose 104 70 - 110 mg/dL    BUN, Bld 21 8 - 23 mg/dL    Creatinine 0.9 0.5 - 1.4 mg/dL    Calcium 8.6 (L) 8.7 - 10.5 mg/dL    Total Protein 6.0 6.0 - 8.4 g/dL    Albumin 2.6 (L) 3.5 - 5.2 g/dL    Total Bilirubin 1.3 (H) 0.1 - 1.0 mg/dL    Alkaline Phosphatase 77 55 - 135 U/L    AST 33 10 - 40 U/L    ALT 28 10 - 44 U/L    Anion Gap 5 (L) 8 - 16 mmol/L    eGFR if African American >60.0 >60 mL/min/1.73 m^2    eGFR if non African American >60.0 >60 mL/min/1.73 m^2   POCT glucose    Collection Time: 11/11/18  8:24 AM   Result Value Ref Range    POCT Glucose 110 70 - 110 mg/dL         Significant Imaging: no new img

## 2018-11-11 NOTE — CARE UPDATE
RN Proactive Rounding Note  Time of Visit: 0900    Admit Date: 2018  LOS: 4  Code Status: Full Code   Date of Visit: 2018  : 1940  Age: 78 y.o.  Sex: male  Race: White  Bed: 356/356 A:   MRN: [id number]  Was the patient discharged from an ICU this admission? yes   Was the patient discharged from a PACU within last 24 hours?  no  Did the patient receive conscious sedation/general anesthesia in last 24 hours?  no  Was the patient in the ED within the past 24 hours?  no  Was the patient started on NIPPV within the past 24 hours?  yes/ CPAP  Attending Physician: Binta Virk MD  Primary Service: Networked reference to record PCT       ASSESSMENT:     Abnormal Vital Signs: Arrhythmia  Clinical Issues: Arrhythmia      INTERVENTIONS/ RECOMMENDATIONS:     Called to see the pt. Pt post op VT ablation and left lead replacement. Pt has history of recurrent pleomorphic VTs/p multiple defibrillator shocks now having non sustained VT/Afib with BBB episode after doing his IS. He remained awake and alert and VS remained stable. The pt was not shocked by his AICD. Dr Souza at the BS. Pacer and AICD interrogated and pt is now V paced. VSS. Plan to discuss pacer/AICD settings with staff MD and adjust meds. Pt currently AAO x3 and wife at the BS. Claudette, charge RN updated on POC and RRT will continue to follow.     Discussed plan of care with charge RN, Claudette    PHYSICIAN ESCALATION:     Yes/No  yes    Orders received and case discussed with Dr. Souza    Disposition: Remain in room 356.    FOLLOW-UP/CONTINGENCY:     Call back the Rapid Response Nurse at [phone] for additional questions or concerns.

## 2018-11-11 NOTE — ASSESSMENT & PLAN NOTE
Continuing home medications at this time:  -Amio 200 BID  -ASA 81mg QD  -Carvediolo 6.25mg BID  -Dig 125 mcg QD

## 2018-11-11 NOTE — PLAN OF CARE
Problem: Physical Therapy Goal  Goal: Physical Therapy Goal  Goals to be met by:      Patient will increase functional independence with mobility by performin. Supine to sit with [place]  2. Sit to stand transfer with [place]  3. Gait  x 250 feet with Supervision using no AD   4. Lower extremity exercise program x20 reps per handout, with independence    Outcome: Ongoing (interventions implemented as appropriate)  Pt evaluated and appropriate goals established.

## 2018-11-11 NOTE — PLAN OF CARE
Problem: Patient Care Overview  Goal: Plan of Care Review  Outcome: Ongoing (interventions implemented as appropriate)  Reviewed plan of care, verbalized understanding  Answered some questions  Pt remained free from falls  One episode of Vtach, HR running 170s, EP interrogated CRTD  Pain controlled, pt prefers tylenol when necessary  Ice on right shoulder for discomfort from procedure  Arm immobilization on pt  Bilateral groin sites free from hematoma, and dressings CDI  Will continue to monitior

## 2018-11-11 NOTE — ASSESSMENT & PLAN NOTE
-on warfarin   -trend levels daily and adjust for goal INR 2.0-3.0    Lab Results   Component Value Date    INR 2.9 (H) 11/11/2018    INR 2.7 (H) 11/10/2018    INR 2.8 (H) 11/09/2018

## 2018-11-11 NOTE — PROGRESS NOTES
[facility]  Cardiac Electrophysiology  Progress Note    Admission Date: 11/7/2018  Code Status: Full Code   Attending Physician: Binta Virk MD   Expected Discharge Date: 11/12/2018  Principal Problem:VT (ventricular tachycardia)    Subjective:     Interval History: No events over night. Denies cardiovascular symptoms (angina, dyspnea, palpitations, syncope).  Today AM: he had an episode of regular monomorphic wide complex tachycardia w HR up to 170s for 13 min after doing incentive inspirometry  Interrogation of CRT-D: The episode was due to Afib w RVR      Review of Systems   HENT: Negative for congestion and ear discharge.    Eyes: Negative for blurred vision and discharge.   Cardiovascular: Negative for chest pain and claudication.   Respiratory: Negative for cough and hemoptysis.    Endocrine: Negative for cold intolerance and heat intolerance.     Objective:     Vital Signs (Most Recent):  Temp: 97.8 °F (36.6 °C) (11/11/18 0745)  Pulse: 79 (11/11/18 1100)  Resp: 18 (11/11/18 0745)  BP: 101/60 (11/11/18 0822)  SpO2: (!) 94 % (11/11/18 0822) Vital Signs (24h Range):  Temp:  [97.1 °F (36.2 °C)-98.4 °F (36.9 °C)] 97.8 °F (36.6 °C)  Pulse:  [] 79  Resp:  [16-18] 18  SpO2:  [90 %-96 %] 94 %  BP: ()/(53-74) 101/60     Weight: 76.7 kg (169 lb)  Body mass index is 22.92 kg/m².     SpO2: (!) 94 %  O2 Device (Oxygen Therapy): nasal cannula    Physical Exam   Constitutional: He is oriented to person, place, and time. He appears well-developed and well-nourished.   HENT:   Head: Normocephalic and atraumatic.   Nose: Nose normal.   Mouth/Throat: No oropharyngeal exudate.   Eyes: Right eye exhibits no discharge. Left eye exhibits no discharge. No scleral icterus.   Neck: Normal range of motion. Neck supple. No JVD present.   Cardiovascular: Normal rate, regular rhythm, S1 normal and S2 normal. Exam reveals no gallop, no S3, no S4, no distant heart sounds, no friction rub, no  midsystolic click and no opening snap.   No murmur heard.  Pulses:       Radial pulses are 2+ on the right side, and 2+ on the left side.        Femoral pulses are 2+ on the right side, and 2+ on the left side.  Pulmonary/Chest: Effort normal and breath sounds normal. No respiratory distress. He has no wheezes. He has no rales. He exhibits no tenderness.   Abdominal: Soft. Bowel sounds are normal. He exhibits no distension. There is no tenderness. There is no rebound.   Musculoskeletal: Normal range of motion. He exhibits no edema, tenderness or deformity.   Lymphadenopathy:     He has no cervical adenopathy.   Neurological: He is alert and oriented to person, place, and time. No cranial nerve deficit.   Skin: Skin is warm and dry.   Psychiatric: He has a normal mood and affect. His behavior is normal.       Significant Labs:   Blood Culture: No results for input(s): LABBLOO in the last 48 hours., BMP:   Recent Labs   Lab 11/10/18  0414  11/10/18  1235 11/10/18  1728 11/11/18  0338 11/11/18  0705   GLU  --    < > 157* 140*  --  104   NA  --    < > 139 138  --  134*   K  --    < > 4.1 4.0  --  4.2   CL  --    < > 101 100  --  103   CO2  --    < > 28 27  --  26   BUN  --    < > 26* 25*  --  21   CREATININE  --    < > 1.1 1.0  --  0.9   CALCIUM  --    < > 9.1 8.9  --  8.6*   MG 2.3  --   --   --  2.1  --     < > = values in this interval not displayed.   , CMP:   Recent Labs   Lab 11/10/18  1235 11/10/18  1728 11/11/18  0705    138 134*   K 4.1 4.0 4.2    100 103   CO2 28 27 26   * 140* 104   BUN 26* 25* 21   CREATININE 1.1 1.0 0.9   CALCIUM 9.1 8.9 8.6*   PROT 6.6 6.6 6.0   ALBUMIN 2.8* 2.8* 2.6*   BILITOT 1.5* 1.2* 1.3*   ALKPHOS 95 90 77   AST 36 35 33   ALT 31 31 28   ANIONGAP 10 11 5*   ESTGFRAFRICA >60.0 >60.0 >60.0   EGFRNONAA >60.0 >60.0 >60.0    and INR:   Recent Labs   Lab 11/10/18  0910 11/11/18  0338   INR 2.7* 2.9*       Significant Imaging: Telemetry, CRT-D EGM    Assessment and Plan:      * VT (ventricular tachycardia)    78 yr old man with PMH of persistent Afib, Mixed CM Ef 25% s/p Medtronic CRTD upgrade 1/17, S/p  Mechanical AVR in 1990, s/p 1v CABG(SVG to RCA) 1990,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     Had an episode of Afib w RVR today that was very regular.    -Switch from coreg to metoprolol and maximize as tolerated, then consider adding the other HF GDMT    Case discussed w Dr Harvinder Orellana MD  Cardiac Electrophysiology  [facility]

## 2018-11-11 NOTE — PT/OT/SLP EVAL
"Physical Therapy Evaluation    Patient Name:  Donavan Mathews   MRN:  [id number]    Recommendations:     Discharge Recommendations:  home health PT   Discharge Equipment Recommendations: none   Barriers to discharge: None    Assessment:     Donavan Mathews is a 78 y.o. male admitted with a medical diagnosis of VT (ventricular tachycardia).  He presents with the following impairments/functional limitations:  weakness, impaired endurance, impaired functional mobilty, gait instability, impaired balance, impaired cardiopulmonary response to activity. Pt tolerated activity with no reports of dizziness, SOB, no LOB with ambulation. Pt required CGA for OOB mobility, with decreased step size, decreased trunk dissociation with ambulation. Pt mobility primarily limited by weakness, gait instability. Pt is progressing toward goals and would continue to benefit from acute skilled therapy intervention to address deficits and progress toward prior level of function.       Rehab Prognosis:  good; patient would benefit from acute skilled PT services to address these deficits and reach maximum level of function.      Recent Surgery: Procedure(s) (LRB):  REVISION, INSERTION, ELECTRODE LEAD, ICD (Right)  Venogram, EP Lab (Right)  Revision, Skin Pocket, For Cardiac Pacemaker (Right) 3 Days Post-Op    Plan:     During this hospitalization, patient to be seen 3 x/week to address the above listed problems via gait training, therapeutic activities, therapeutic exercises, neuromuscular re-education  · Plan of Care Expires:  12/11/18   Plan of Care Reviewed with: patient, spouse    Subjective     Communicated with RN prior to session.  Patient found supine upon PT entry to room, agreeable to evaluation.      Chief Complaint: "I just need to get my legs back, I haven't walked in a week"   Patient comments/goals: to get better and return home   Pain/Comfort:  · Pain Rating 1: 0/10  · Pain Rating Post-Intervention 1: 0/10    Patients cultural, " spiritual, Christian conflicts given the current situation: none reported     Living Environment:  Pt lives with spouse in [facility] with no DB.   Prior to admission, patients level of function was independent with all mobility, had been driving independently, but has not driven since cardiac problems began.  Patient has the following equipment: CPAP.  DME owned (not currently used): none.  Upon discharge, patient will have assistance from wife.    Objective:     Patient found with: telemetry(sling )     General Precautions: Standard, aspiration, respiratory, fall, pacemaker   Orthopedic Precautions:N/A   Braces: N/A     Exams:  · Cognitive Exam:  Patient is AAOx4, followed all commands, communicates clearly and fluently  · Gross Motor Coordination:  WFL  · RLE ROM: WFL  · RLE Strength: WFL  · LLE ROM: WFL  · LLE Strength: WFL    Functional Mobility:  · Bed Mobility:     · Supine to Sit: minimum assistance for ascent of trunk and shoulders while maintaining compliance with PPM precautions   · Transfers:     · Sit to Stand:  contact guard assistance with no AD  · Gait: Pt ambulated 115 feet with no AD and CGA. Pt demo'd decreased prema, very small step size, decreased foot clearance, decreased trunk dissociation with minimal arm swing, increased lateral sway. Pt stopped 2x during ambulation to stretch legs. Pt with no LOB, no dizziness, no reports of SOB.     AM-PAC 6 CLICK MOBILITY  Total Score:19       Therapeutic Activities and Exercises:   Pt educated on role of PT/POC. Pt encouraged to only perform OOB or out of chair activity with nursing or therapy present. Pt and spouse verbalized understanding.   Pt educated on seated exercises to perform 20x, 3x/day. Exercises included bilateral LAQ, marching, ankle DF/PF      Patient left up in chair with all lines intact and call button in reach, spouse present.     GOALS:   Multidisciplinary Problems     Physical Therapy Goals        Problem: Physical Therapy Goal    Goal  Priority Disciplines Outcome Goal Variances Interventions   Physical Therapy Goal     PT, PT/OT Ongoing (interventions implemented as appropriate)     Description:  Goals to be met by:      Patient will increase functional independence with mobility by performin. Supine to sit with [place]  2. Sit to stand transfer with [place]  3. Gait  x 250 feet with Supervision using no AD   4. Lower extremity exercise program x20 reps per handout, with independence                      History:     Past Medical History:   Diagnosis Date    A-fib     persistant     Cardiomyopathy     mixed - s/p CRTD  medtronic     Current use of long term anticoagulation     Hx of CABG     Hyperthyroidism     Restrictive lung disease     S/P AVR (aortic valve replacement)     mechanical on warfarin    VT (ventricular tachycardia)     pleomorphic - multiple monomorphic        Past Surgical History:   Procedure Laterality Date    AORTIC VALVE REPLACEMENT         Clinical Decision Making:     History  Co-morbidities and personal factors that may impact the plan of care Examination  Body Structures and Functions, activity limitations and participation restrictions that may impact the plan of care Clinical Presentation   Decision Making/ Complexity Score   Co-morbidities:   [] Time since onset of injury / illness / exacerbation  [] Status of current condition  []Patient's cognitive status and safety concerns    [] Multiple Medical Problems (see med hx)  Personal Factors:   [] Patient's age  [] Prior Level of function   [] Patient's home situation (environment and family support)  [] Patient's level of motivation  [] Expected progression of patient      HISTORY:(criteria)    [] 55061 - no personal factors/history    [] 70710 - has 1-2 personal factor/comorbidity     [] 71110 - has >3 personal factor/comorbidity     Body Regions:  [] Objective examination findings  [] Head     []  Neck  [] Trunk    [] Upper Extremity  [] Lower Extremity    Body Systems:  [] For communication ability, affect, cognition, language, and learning style: the assessment of the ability to make needs known, consciousness, orientation (person, place, and time), expected emotional /behavioral responses, and learning preferences (eg, learning barriers, education  needs)  [] For the neuromuscular system: a general assessment of gross coordinated movement (eg, balance, gait, locomotion, transfers, and transitions) and motor function  (motor control and motor learning)  [] For the musculoskeletal system: the assessment of gross symmetry, gross range of motion, gross strength, height, and weight  [] For the integumentary system: the assessment of pliability(texture), presence of scar formation, skin color, and skin integrity  [] For cardiovascular/pulmonary system: the assessment of heart rate, respiratory rate, blood pressure, and edema     Activity limitations:    [] Patient's cognitive status and saf ety concerns          [] Status of current condition      [] Weight bearing restriction  [] Cardiopulmunary Restriction    Participation Restrictions:   [] Goals and goal agreement with the patient     [] Rehab potential (prognosis) and probable outcome      Examination of Body System: (criteria)    [] 50379 - addressing 1-2 elements    [] 88935 - addressing a total of 3 or more elements     [] 44409 -  Addressing a total of 4 or more elements         Clinical Presentation: (criteria)  Choose one     On examination of body system using standardized tests and measures patient presents with 1-2 elements from any of the following: body structures and functions, activity limitations, and/or participation restrictions.  Leading to a clinical presentation that is considered stable and/or uncomplicated                              Clinical Decision Making  (Eval Complexity):  Low- 55037     Time Tracking:     PT Received On: 11/11/18  PT Start  Time: 1427     PT Stop Time: 1451  PT Total Time (min): 24 min     Billable Minutes: Evaluation 10 mins  and Gait Training 14 mins       Lesia Sim, PT  11/11/2018

## 2018-11-11 NOTE — SIGNIFICANT EVENT
CRT-D pocket checked today PM, no significant change from AM noticed. No drainage, erythema, or active bleeding. Size of hematoma stable.

## 2018-11-11 NOTE — PROGRESS NOTES
[facility]  Cardiology  Progress Note    Patient Name: Donavan Mtahews  MRN: [id number]  Admission Date: 11/7/2018  Hospital Length of Stay: 4 days  Code Status: Full Code   Attending Physician: Binta Virk MD   Primary Care Physician: Tim Luis MD  Expected Discharge Date: 11/12/2018  Principal Problem:VT (ventricular tachycardia)    Subjective:     Hospital Course:   Patient admitted for recurrent pleomorphic VT sustaining several shocks from AICD, taken to EP lab for VT ablation. Patient came back from EP lab intubated and on pressors. If was felt that the LV lead was not capturing and needed LV lead revision and venogram. Patient came back from EP lab intubated and on pressors. Patient weaned off pressor support and extubated 11/8 in evening. Patient tolerating post procedurally.    Interval History: Patient continues to spike fevers, broaden ABx. WCTM      Review of Systems   Constitution: Negative.   HENT: Negative.    Eyes: Negative.    Cardiovascular: Positive for irregular heartbeat. Negative for chest pain.   Respiratory: Negative for shortness of breath (improving now that sitting up).    Endocrine: Negative.    Hematologic/Lymphatic: Negative.    Skin: Negative.    Musculoskeletal:        Chest wall pain on right, improves with ice    Gastrointestinal: Negative.    Genitourinary: Negative.    Neurological: Negative.    Psychiatric/Behavioral: Negative.      Objective:     Vital Signs (Most Recent):  Temp: 97.8 °F (36.6 °C) (11/11/18 0745)  Pulse: 66 (11/11/18 0751)  Resp: 18 (11/11/18 0745)  BP: 101/60 (11/11/18 0822)  SpO2: (!) 94 % (11/11/18 0822) Vital Signs (24h Range):  Temp:  [97.1 °F (36.2 °C)-98.4 °F (36.9 °C)] 97.8 °F (36.6 °C)  Pulse:  [] 66  Resp:  [16-18] 18  SpO2:  [90 %-96 %] 94 %  BP: ()/(53-74) 101/60     Weight: 76.7 kg (169 lb)  Body mass index is 22.92 kg/m².     SpO2: (!) 94 %  O2 Device (Oxygen Therapy): nasal cannula      Intake/Output  Summary (Last 24 hours) at 11/11/2018 1024  Last data filed at 11/11/2018 0800  Gross per 24 hour   Intake 1200 ml   Output 1475 ml   Net -275 ml       Lines/Drains/Airways     Peripheral Intravenous Line                 Peripheral IV - Single Lumen 11/07/18 1541 Left Antecubital 3 days         Peripheral IV - Single Lumen 11/07/18 1541 Left Wrist 3 days                Physical Exam   Constitutional: He appears well-developed and well-nourished. No distress.   HENT:   Head: Normocephalic and atraumatic.   Mouth/Throat: No oropharyngeal exudate.   Eyes: Pupils are equal, round, and reactive to light. No scleral icterus.   Neck: Normal range of motion. Neck supple. No JVD present.   Cardiovascular:   Murmur heard.  Frequent PVCs and non-sustained VT on tele    Pulmonary/Chest: Effort normal and breath sounds normal. No respiratory distress.   Abdominal: Soft. Bowel sounds are normal. He exhibits no distension. There is no tenderness.   Musculoskeletal: Normal range of motion. He exhibits no edema.   Neurological: He is alert. No cranial nerve deficit.   Skin: Skin is warm and dry. He is not diaphoretic. No erythema.   Vitals reviewed.      Significant Labs:   Recent Results (from the past 24 hour(s))   Comprehensive metabolic panel    Collection Time: 11/10/18 12:35 PM   Result Value Ref Range    Sodium 139 136 - 145 mmol/L    Potassium 4.1 3.5 - 5.1 mmol/L    Chloride 101 95 - 110 mmol/L    CO2 28 23 - 29 mmol/L    Glucose 157 (H) 70 - 110 mg/dL    BUN, Bld 26 (H) 8 - 23 mg/dL    Creatinine 1.1 0.5 - 1.4 mg/dL    Calcium 9.1 8.7 - 10.5 mg/dL    Total Protein 6.6 6.0 - 8.4 g/dL    Albumin 2.8 (L) 3.5 - 5.2 g/dL    Total Bilirubin 1.5 (H) 0.1 - 1.0 mg/dL    Alkaline Phosphatase 95 55 - 135 U/L    AST 36 10 - 40 U/L    ALT 31 10 - 44 U/L    Anion Gap 10 8 - 16 mmol/L    eGFR if African American >60.0 >60 mL/min/1.73 m^2    eGFR if non African American >60.0 >60 mL/min/1.73 m^2   Comprehensive metabolic panel     Collection Time: 11/10/18  5:28 PM   Result Value Ref Range    Sodium 138 136 - 145 mmol/L    Potassium 4.0 3.5 - 5.1 mmol/L    Chloride 100 95 - 110 mmol/L    CO2 27 23 - 29 mmol/L    Glucose 140 (H) 70 - 110 mg/dL    BUN, Bld 25 (H) 8 - 23 mg/dL    Creatinine 1.0 0.5 - 1.4 mg/dL    Calcium 8.9 8.7 - 10.5 mg/dL    Total Protein 6.6 6.0 - 8.4 g/dL    Albumin 2.8 (L) 3.5 - 5.2 g/dL    Total Bilirubin 1.2 (H) 0.1 - 1.0 mg/dL    Alkaline Phosphatase 90 55 - 135 U/L    AST 35 10 - 40 U/L    ALT 31 10 - 44 U/L    Anion Gap 11 8 - 16 mmol/L    eGFR if African American >60.0 >60 mL/min/1.73 m^2    eGFR if non African American >60.0 >60 mL/min/1.73 m^2   CBC auto differential    Collection Time: 11/11/18  3:38 AM   Result Value Ref Range    WBC 9.55 3.90 - 12.70 K/uL    RBC 3.92 (L) 4.60 - 6.20 M/uL    Hemoglobin 12.1 (L) 14.0 - 18.0 g/dL    Hematocrit 37.3 (L) 40.0 - 54.0 %    MCV 95 82 - 98 fL    MCH 30.9 27.0 - 31.0 pg    MCHC 32.4 32.0 - 36.0 g/dL    RDW 14.7 (H) 11.5 - 14.5 %    Platelets 132 (L) 150 - 350 K/uL    MPV 12.0 9.2 - 12.9 fL    Immature Granulocytes 0.3 0.0 - 0.5 %    Gran # (ANC) 7.7 1.8 - 7.7 K/uL    Immature Grans (Abs) 0.03 0.00 - 0.04 K/uL    Lymph # 0.7 (L) 1.0 - 4.8 K/uL    Mono # 0.9 0.3 - 1.0 K/uL    Eos # 0.1 0.0 - 0.5 K/uL    Baso # 0.02 0.00 - 0.20 K/uL    nRBC 0 0 /100 WBC    Gran% 81.1 (H) 38.0 - 73.0 %    Lymph% 7.7 (L) 18.0 - 48.0 %    Mono% 9.5 4.0 - 15.0 %    Eosinophil% 1.2 0.0 - 8.0 %    Basophil% 0.2 0.0 - 1.9 %    Differential Method Automated    Magnesium    Collection Time: 11/11/18  3:38 AM   Result Value Ref Range    Magnesium 2.1 1.6 - 2.6 mg/dL   Phosphorus    Collection Time: 11/11/18  3:38 AM   Result Value Ref Range    Phosphorus 3.1 2.7 - 4.5 mg/dL   Protime-INR    Collection Time: 11/11/18  3:38 AM   Result Value Ref Range    Prothrombin Time 28.1 (H) 9.0 - 12.5 sec    INR 2.9 (H) 0.8 - 1.2   Comprehensive metabolic panel    Collection Time: 11/11/18  7:05 AM   Result Value  Ref Range    Sodium 134 (L) 136 - 145 mmol/L    Potassium 4.2 3.5 - 5.1 mmol/L    Chloride 103 95 - 110 mmol/L    CO2 26 23 - 29 mmol/L    Glucose 104 70 - 110 mg/dL    BUN, Bld 21 8 - 23 mg/dL    Creatinine 0.9 0.5 - 1.4 mg/dL    Calcium 8.6 (L) 8.7 - 10.5 mg/dL    Total Protein 6.0 6.0 - 8.4 g/dL    Albumin 2.6 (L) 3.5 - 5.2 g/dL    Total Bilirubin 1.3 (H) 0.1 - 1.0 mg/dL    Alkaline Phosphatase 77 55 - 135 U/L    AST 33 10 - 40 U/L    ALT 28 10 - 44 U/L    Anion Gap 5 (L) 8 - 16 mmol/L    eGFR if African American >60.0 >60 mL/min/1.73 m^2    eGFR if non African American >60.0 >60 mL/min/1.73 m^2   POCT glucose    Collection Time: 11/11/18  8:24 AM   Result Value Ref Range    POCT Glucose 110 70 - 110 mg/dL         Significant Imaging: no new img     Assessment and Plan:     Brief HPI: see above    * VT (ventricular tachycardia)    78 yr old man with PMH of persistent Afib, Mixed CM Ef 25% s/p Medtronic CRTD upgrade 1/17, S/p  Mechanical AVR in 1990, s/p 1v CABG(SVG to RCA) 1990,restrictive lung disease potentially associated to amiodarone (per Dr Aguayo's note), recurrent pleomorphic VTs/p multiple defibrillator shock was referred to EP clinic by Dr Mehta for eval of VT.    -Taken for VT ablation, failed  -Taken for LV lead revision 11/8 with EP  -Continues to have PVCs and NSVT (2-4 beats)    EP recs:  -Continue dig, amiodarone  -Electrolyte replacement PRN (K>4, Mg>2)     A-fib    Continuing home medications at this time:  -Amio 200 BID  -ASA 81mg QD  -Carvediolo 6.25mg BID  -Dig 125 mcg QD            Cardiomyopathy    Mixed CMP <20% s/p CRT-D  Global hypokinesis worse in inferior and inferoseptal walls  Mildly dilated LV and RV    Reinitiated home meds: (with exception of Cardizem)  -Amio 200 BID  -ASA 81mg QD  -Carvediolo 6.25mg BID (will up titrate to 25)  -Dig 125 mcg QD   - Atorvastatin 80mg QD  - Eplerenone 25    Started:  -Valsartan with intent to transition to entresto        Current use of long term  anticoagulation    -on warfarin   -trend levels daily and adjust for goal INR 2.0-3.0    Lab Results   Component Value Date    INR 2.9 (H) 11/11/2018    INR 2.7 (H) 11/10/2018    INR 2.8 (H) 11/09/2018          Hx of CABG    -see above        S/P AVR (aortic valve replacement)    -Pharmacy consult for warfarin dosing  -Continue warfarin, per Dr Aguayo's note from clinics         VTE Risk Mitigation (From admission, onward)        Ordered     warfarin (COUMADIN) tablet 2.5 mg  Daily      11/08/18 1109          Samina Jacob MD  Cardiology  [facility]

## 2018-11-11 NOTE — PROGRESS NOTES
[facility]  Cardiac Electrophysiology  Progress Note    Admission Date: 11/7/2018  Code Status: Full Code   Attending Physician: Binta Virk MD   Expected Discharge Date: 11/12/2018  Principal Problem:VT (ventricular tachycardia)    Subjective:     Interval History: No events over night. Denies cardiovascular symptoms (angina, dyspnea, palpitations, syncope). 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    Eyes: Negative for blurred vision and discharge.   Cardiovascular: Negative for chest pain and claudication.   Respiratory: Negative for cough and hemoptysis.    Endocrine: Negative for cold intolerance and heat intolerance.     Objective:     Vital Signs (Most Recent):  Temp: 97.8 °F (36.6 °C) (11/10/18 2000)  Pulse: 80 (11/10/18 2000)  Resp: 18 (11/10/18 1627)  BP: 118/71 (11/10/18 2000)  SpO2: (!) 91 % (11/10/18 2000) Vital Signs (24h Range):  Temp:  [97.8 °F (36.6 °C)-98.4 °F (36.9 °C)] 97.8 °F (36.6 °C)  Pulse:  [74-92] 80  Resp:  [14-18] 18  SpO2:  [88 %-93 %] 91 %  BP: ()/(55-74) 118/71     Weight: 76.7 kg (169 lb)  Body mass index is 22.92 kg/m².     SpO2: (!) 91 %  O2 Device (Oxygen Therapy): room air    Physical Exam   Constitutional: He is oriented to person, place, and time. He appears well-developed and well-nourished.   HENT:   Head: Normocephalic and atraumatic.   Nose: Nose normal.   Mouth/Throat: No oropharyngeal exudate.   Eyes: Right eye exhibits no discharge. Left eye exhibits no discharge. No scleral icterus.   Neck: Normal range of motion. Neck supple. No JVD present.   Cardiovascular: Normal rate, regular rhythm, S1 normal and S2 normal. Exam reveals no gallop, no S3, no S4, no distant heart sounds, no friction rub, no midsystolic click and no opening snap.   No murmur heard.  Pulses:       Radial pulses are 2+ on the right side, and 2+ on the left side.        Femoral pulses are 2+ on  the right side, and 2+ on the left side.  Pulmonary/Chest: Effort normal and breath sounds normal. No respiratory distress. He has no wheezes. He has no rales. He exhibits no tenderness.   Abdominal: Soft. Bowel sounds are normal. He exhibits no distension. There is no tenderness. There is no rebound.   Musculoskeletal: Normal range of motion. He exhibits no edema, tenderness or deformity.   Chest hematoma improving   Lymphadenopathy:     He has no cervical adenopathy.   Neurological: He is alert and oriented to person, place, and time. No cranial nerve deficit.   Skin: Skin is warm and dry.   Psychiatric: He has a normal mood and affect. His behavior is normal.       Significant Labs:   BMP:   Recent Labs   Lab 11/09/18  0234 11/09/18  1006  11/10/18  0414 11/10/18  0910 11/10/18  1235 11/10/18  1728     --    < >  --  149*  144* 157* 140*     --    < >  --  139  138 139 138   K 3.5 3.9   < >  --  4.1  4.1 4.1 4.0     --    < >  --  102  103 101 100   CO2 23  --    < >  --  29  27 28 27   BUN 18  --    < >  --  25*  25* 26* 25*   CREATININE 1.0  --    < >  --  1.0  1.1 1.1 1.0   CALCIUM 8.0*  --    < >  --  9.1  9.0 9.1 8.9   MG 2.0 2.2  2.2  --  2.3  --   --   --     < > = values in this interval not displayed.   , CMP:   Recent Labs   Lab 11/10/18  0910 11/10/18  1235 11/10/18  1728     138 139 138   K 4.1  4.1 4.1 4.0     103 101 100   CO2 29  27 28 27   *  144* 157* 140*   BUN 25*  25* 26* 25*   CREATININE 1.0  1.1 1.1 1.0   CALCIUM 9.1  9.0 9.1 8.9   PROT 6.6  6.5 6.6 6.6   ALBUMIN 2.8*  2.8* 2.8* 2.8*   BILITOT 1.6*  1.7* 1.5* 1.2*   ALKPHOS 81  92 95 90   AST 35  35 36 35   ALT 31  31 31 31   ANIONGAP 8  8 10 11   ESTGFRAFRICA >60.0  >60.0 >60.0 >60.0   EGFRNONAA >60.0  >60.0 >60.0 >60.0    and CBC:   Recent Labs   Lab 11/09/18  0234 11/10/18  0414   WBC 14.64* 12.26   HGB 12.7* 13.5*   HCT 39.9* 41.0   * 132*       Significant Imaging:  Interrogation of CRT-D    Assessment and Plan:     * VT (ventricular tachycardia)    78 yr old man with PMH of persistent Afib, Mixed CM Ef 25% s/p Medtronic CRTD upgrade 1/17, S/p  Mechanical AVR in 1990, s/p 1v CABG(SVG to RCA) 1990,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     Post procedure Xray w/o pneumothorax (abdominal Xray with extension into chest)  Consent for LV lead revision left on chart     F/U EP recs:  -Continue dig, amiodarone  -Continue holding CCB  -Increase coreg as tolerated    Case discussed w Dr Harvinder Orellana MD  Cardiac Electrophysiology  [facility]

## 2018-11-11 NOTE — SUBJECTIVE & OBJECTIVE
Interval History: No events over night. Denies cardiovascular symptoms (angina, dyspnea, palpitations, syncope). 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    Eyes: Negative for blurred vision and discharge.   Cardiovascular: Negative for chest pain and claudication.   Respiratory: Negative for cough and hemoptysis.    Endocrine: Negative for cold intolerance and heat intolerance.     Objective:     Vital Signs (Most Recent):  Temp: 97.8 °F (36.6 °C) (11/10/18 2000)  Pulse: 80 (11/10/18 2000)  Resp: 18 (11/10/18 1627)  BP: 118/71 (11/10/18 2000)  SpO2: (!) 91 % (11/10/18 2000) Vital Signs (24h Range):  Temp:  [97.8 °F (36.6 °C)-98.4 °F (36.9 °C)] 97.8 °F (36.6 °C)  Pulse:  [74-92] 80  Resp:  [14-18] 18  SpO2:  [88 %-93 %] 91 %  BP: ()/(55-74) 118/71     Weight: 76.7 kg (169 lb)  Body mass index is 22.92 kg/m².     SpO2: (!) 91 %  O2 Device (Oxygen Therapy): room air    Physical Exam   Constitutional: He is oriented to person, place, and time. He appears well-developed and well-nourished.   HENT:   Head: Normocephalic and atraumatic.   Nose: Nose normal.   Mouth/Throat: No oropharyngeal exudate.   Eyes: Right eye exhibits no discharge. Left eye exhibits no discharge. No scleral icterus.   Neck: Normal range of motion. Neck supple. No JVD present.   Cardiovascular: Normal rate, regular rhythm, S1 normal and S2 normal. Exam reveals no gallop, no S3, no S4, no distant heart sounds, no friction rub, no midsystolic click and no opening snap.   No murmur heard.  Pulses:       Radial pulses are 2+ on the right side, and 2+ on the left side.        Femoral pulses are 2+ on the right side, and 2+ on the left side.  Pulmonary/Chest: Effort normal and breath sounds normal. No respiratory distress. He has no wheezes. He has no rales. He exhibits no tenderness.   Abdominal: Soft. Bowel sounds are normal. He exhibits no distension. There is no  tenderness. There is no rebound.   Musculoskeletal: Normal range of motion. He exhibits no edema, tenderness or deformity.   Chest hematoma improving   Lymphadenopathy:     He has no cervical adenopathy.   Neurological: He is alert and oriented to person, place, and time. No cranial nerve deficit.   Skin: Skin is warm and dry.   Psychiatric: He has a normal mood and affect. His behavior is normal.       Significant Labs:   BMP:   Recent Labs   Lab 11/09/18  0234 11/09/18  1006  11/10/18  0414 11/10/18  0910 11/10/18  1235 11/10/18  1728     --    < >  --  149*  144* 157* 140*     --    < >  --  139  138 139 138   K 3.5 3.9   < >  --  4.1  4.1 4.1 4.0     --    < >  --  102  103 101 100   CO2 23  --    < >  --  29  27 28 27   BUN 18  --    < >  --  25*  25* 26* 25*   CREATININE 1.0  --    < >  --  1.0  1.1 1.1 1.0   CALCIUM 8.0*  --    < >  --  9.1  9.0 9.1 8.9   MG 2.0 2.2  2.2  --  2.3  --   --   --     < > = values in this interval not displayed.   , CMP:   Recent Labs   Lab 11/10/18  0910 11/10/18  1235 11/10/18  1728     138 139 138   K 4.1  4.1 4.1 4.0     103 101 100   CO2 29  27 28 27   *  144* 157* 140*   BUN 25*  25* 26* 25*   CREATININE 1.0  1.1 1.1 1.0   CALCIUM 9.1  9.0 9.1 8.9   PROT 6.6  6.5 6.6 6.6   ALBUMIN 2.8*  2.8* 2.8* 2.8*   BILITOT 1.6*  1.7* 1.5* 1.2*   ALKPHOS 81  92 95 90   AST 35  35 36 35   ALT 31  31 31 31   ANIONGAP 8  8 10 11   ESTGFRAFRICA >60.0  >60.0 >60.0 >60.0   EGFRNONAA >60.0  >60.0 >60.0 >60.0    and CBC:   Recent Labs   Lab 11/09/18  0234 11/10/18  0414   WBC 14.64* 12.26   HGB 12.7* 13.5*   HCT 39.9* 41.0   * 132*       Significant Imaging: Interrogation of CRT-D

## 2018-11-11 NOTE — ASSESSMENT & PLAN NOTE
Mixed CMP <20% s/p CRT-D  Global hypokinesis worse in inferior and inferoseptal walls  Mildly dilated LV and RV    Reinitiated home meds: (with exception of Cardizem)  -Amio 200 BID  -ASA 81mg QD  -Carvediolo 6.25mg BID (will up titrate to 25)  -Dig 125 mcg QD   - Atorvastatin 80mg QD  - Eplerenone 25    Started:  -Valsartan with intent to transition to entresto

## 2018-11-12 LAB
ALBUMIN SERPL BCP-MCNC: 2.7 G/DL
ALBUMIN SERPL BCP-MCNC: 2.9 G/DL
ALBUMIN SERPL BCP-MCNC: 2.9 G/DL
ALP SERPL-CCNC: 101 U/L
ALP SERPL-CCNC: 104 U/L
ALP SERPL-CCNC: 105 U/L
ALT SERPL W/O P-5'-P-CCNC: 26 U/L
ALT SERPL W/O P-5'-P-CCNC: 27 U/L
ALT SERPL W/O P-5'-P-CCNC: 27 U/L
ANION GAP SERPL CALC-SCNC: 10 MMOL/L
ANION GAP SERPL CALC-SCNC: 10 MMOL/L
ANION GAP SERPL CALC-SCNC: 9 MMOL/L
AST SERPL-CCNC: 34 U/L
AST SERPL-CCNC: 36 U/L
AST SERPL-CCNC: 38 U/L
BASOPHILS # BLD AUTO: 0.02 K/UL
BASOPHILS NFR BLD: 0.2 %
BILIRUB SERPL-MCNC: 1.1 MG/DL
BILIRUB SERPL-MCNC: 1.2 MG/DL
BILIRUB SERPL-MCNC: 1.4 MG/DL
BUN SERPL-MCNC: 24 MG/DL
BUN SERPL-MCNC: 25 MG/DL
BUN SERPL-MCNC: 26 MG/DL
CALCIUM SERPL-MCNC: 8.8 MG/DL
CALCIUM SERPL-MCNC: 9.1 MG/DL
CALCIUM SERPL-MCNC: 9.2 MG/DL
CHLORIDE SERPL-SCNC: 100 MMOL/L
CHLORIDE SERPL-SCNC: 101 MMOL/L
CHLORIDE SERPL-SCNC: 101 MMOL/L
CO2 SERPL-SCNC: 26 MMOL/L
CO2 SERPL-SCNC: 27 MMOL/L
CO2 SERPL-SCNC: 28 MMOL/L
CREAT SERPL-MCNC: 0.9 MG/DL
DIFFERENTIAL METHOD: ABNORMAL
EOSINOPHIL # BLD AUTO: 0.2 K/UL
EOSINOPHIL NFR BLD: 1.6 %
ERYTHROCYTE [DISTWIDTH] IN BLOOD BY AUTOMATED COUNT: 14.6 %
EST. GFR  (AFRICAN AMERICAN): >60 ML/MIN/1.73 M^2
EST. GFR  (NON AFRICAN AMERICAN): >60 ML/MIN/1.73 M^2
GLUCOSE SERPL-MCNC: 108 MG/DL
GLUCOSE SERPL-MCNC: 81 MG/DL
GLUCOSE SERPL-MCNC: 96 MG/DL
HCT VFR BLD AUTO: 38.5 %
HGB BLD-MCNC: 12.8 G/DL
IMM GRANULOCYTES # BLD AUTO: 0.05 K/UL
IMM GRANULOCYTES NFR BLD AUTO: 0.5 %
INR PPP: 3.5
LYMPHOCYTES # BLD AUTO: 0.8 K/UL
LYMPHOCYTES NFR BLD: 8.1 %
MAGNESIUM SERPL-MCNC: 2.3 MG/DL
MCH RBC QN AUTO: 30.7 PG
MCHC RBC AUTO-ENTMCNC: 33.2 G/DL
MCV RBC AUTO: 92 FL
MONOCYTES # BLD AUTO: 0.9 K/UL
MONOCYTES NFR BLD: 9.6 %
NEUTROPHILS # BLD AUTO: 7.4 K/UL
NEUTROPHILS NFR BLD: 80 %
NRBC BLD-RTO: 0 /100 WBC
PHOSPHATE SERPL-MCNC: 3.6 MG/DL
PLATELET # BLD AUTO: 200 K/UL
PMV BLD AUTO: 11.2 FL
POTASSIUM SERPL-SCNC: 4.2 MMOL/L
POTASSIUM SERPL-SCNC: 4.3 MMOL/L
POTASSIUM SERPL-SCNC: 4.4 MMOL/L
PROT SERPL-MCNC: 6.3 G/DL
PROT SERPL-MCNC: 6.8 G/DL
PROT SERPL-MCNC: 6.9 G/DL
PROTHROMBIN TIME: 34.2 SEC
RBC # BLD AUTO: 4.17 M/UL
SODIUM SERPL-SCNC: 137 MMOL/L
SODIUM SERPL-SCNC: 137 MMOL/L
SODIUM SERPL-SCNC: 138 MMOL/L
WBC # BLD AUTO: 9.28 K/UL

## 2018-11-12 PROCEDURE — 80053 COMPREHEN METABOLIC PANEL: CPT | Mod: 91

## 2018-11-12 PROCEDURE — 25000003 PHARM REV CODE 250: Performed by: STUDENT IN AN ORGANIZED HEALTH CARE EDUCATION/TRAINING PROGRAM

## 2018-11-12 PROCEDURE — 20600001 HC STEP DOWN PRIVATE ROOM

## 2018-11-12 PROCEDURE — 36415 COLL VENOUS BLD VENIPUNCTURE: CPT

## 2018-11-12 PROCEDURE — 97530 THERAPEUTIC ACTIVITIES: CPT

## 2018-11-12 PROCEDURE — 85025 COMPLETE CBC W/AUTO DIFF WBC: CPT

## 2018-11-12 PROCEDURE — 97535 SELF CARE MNGMENT TRAINING: CPT

## 2018-11-12 PROCEDURE — 85610 PROTHROMBIN TIME: CPT

## 2018-11-12 PROCEDURE — 83735 ASSAY OF MAGNESIUM: CPT

## 2018-11-12 PROCEDURE — 25000003 PHARM REV CODE 250: Performed by: INTERNAL MEDICINE

## 2018-11-12 PROCEDURE — 84100 ASSAY OF PHOSPHORUS: CPT

## 2018-11-12 PROCEDURE — 99232 SBSQ HOSP IP/OBS MODERATE 35: CPT | Mod: GC,,, | Performed by: INTERNAL MEDICINE

## 2018-11-12 RX ORDER — FUROSEMIDE 20 MG/1
20 TABLET ORAL DAILY
Status: DISCONTINUED | OUTPATIENT
Start: 2018-11-13 | End: 2018-11-13 | Stop reason: HOSPADM

## 2018-11-12 RX ORDER — WARFARIN 1 MG/1
1 TABLET ORAL DAILY
Status: DISCONTINUED | OUTPATIENT
Start: 2018-11-12 | End: 2018-11-13

## 2018-11-12 RX ORDER — METOPROLOL SUCCINATE 25 MG/1
75 TABLET, EXTENDED RELEASE ORAL 2 TIMES DAILY
Status: DISCONTINUED | OUTPATIENT
Start: 2018-11-12 | End: 2018-11-13

## 2018-11-12 RX ADMIN — POLYETHYLENE GLYCOL 3350 17 G: 17 POWDER, FOR SOLUTION ORAL at 08:11

## 2018-11-12 RX ADMIN — EPLERENONE 25 MG: 25 TABLET ORAL at 08:11

## 2018-11-12 RX ADMIN — ATORVASTATIN CALCIUM 80 MG: 20 TABLET, FILM COATED ORAL at 09:11

## 2018-11-12 RX ADMIN — FUROSEMIDE 40 MG: 40 TABLET ORAL at 08:11

## 2018-11-12 RX ADMIN — VALSARTAN 20 MG: 40 TABLET, FILM COATED ORAL at 09:11

## 2018-11-12 RX ADMIN — AMIODARONE HYDROCHLORIDE 200 MG: 200 TABLET ORAL at 08:11

## 2018-11-12 RX ADMIN — PAROXETINE HYDROCHLORIDE 20 MG: 20 TABLET, FILM COATED ORAL at 08:11

## 2018-11-12 RX ADMIN — DIGOXIN 125 MCG: 125 TABLET ORAL at 08:11

## 2018-11-12 RX ADMIN — CEFADROXIL 500 MG: 500 CAPSULE ORAL at 09:11

## 2018-11-12 RX ADMIN — METOPROLOL SUCCINATE 75 MG: 25 TABLET, EXTENDED RELEASE ORAL at 09:11

## 2018-11-12 RX ADMIN — ASPIRIN 81 MG CHEWABLE TABLET 81 MG: 81 TABLET CHEWABLE at 08:11

## 2018-11-12 RX ADMIN — LEVOTHYROXINE SODIUM 75 MCG: 75 TABLET ORAL at 08:11

## 2018-11-12 RX ADMIN — CEFADROXIL 500 MG: 500 CAPSULE ORAL at 08:11

## 2018-11-12 RX ADMIN — WARFARIN SODIUM 1 MG: 1 TABLET ORAL at 04:11

## 2018-11-12 RX ADMIN — VALSARTAN 20 MG: 40 TABLET, FILM COATED ORAL at 08:11

## 2018-11-12 RX ADMIN — METOPROLOL SUCCINATE 50 MG: 25 TABLET, EXTENDED RELEASE ORAL at 08:11

## 2018-11-12 NOTE — PT/OT/SLP PROGRESS
Occupational Therapy   Treatment    Name: Donavan Mathews  MRN: [id number]  Admitting Diagnosis:  VT (ventricular tachycardia)  4 Days Post-Op    Recommendations:     Discharge Recommendations: home with home health  Discharge Equipment Recommendations:  none  Barriers to discharge:  None    Subjective     Communicated with: RN prior to session. Pt agreeable to therapy session.     Pain/Comfort:  · Pain Rating 1: 0/10  · Pain Rating Post-Intervention 1: 0/10    Patients cultural, spiritual, Rastafari conflicts given the current situation: none     Objective:     Patient found with: telemetry    General Precautions: Standard, fall, respiratory, aspiration(pacemaker )   Orthopedic Precautions:N/A   Braces: N/A     Occupational Performance:    Bed Mobility:    · Pt found seated [facility]     Functional Mobility/Transfers:  · Patient completed Sit <> Stand Transfer from bedside chair and EOB with supervision  with  no assistive device   Functional Mobility: Pt engaged in functional mobility simulating household/community mobility with SBA using no AD.   · Pt completed ~400ft with no seated/standing rest break and no LOB noted     Activities of Daily Living:  · Grooming: supervision pt completed oral care and washed face standing at the sink   · Lower Body Dressing: stand by assistance pt donned long pants with SBA     Patient left up in chair with all lines intact, call button in reach and spouse  present    [facility] 6 Click:  [facility] Total Score: 21    Treatment & Education:  - pt educated on OT POC and goals for therapy   - pt educated on pacemaker precautions and sling wear schedule.  Whiteboard updated   Education:    Assessment:     Donavan Mathews is a 78 y.o. male with a medical diagnosis of VT (ventricular tachycardia).  He presents with performance deficits affecting function are impaired endurance, impaired cardiopulmonary response to activity, impaired self care skills, impaired functional mobilty.      Rehab Prognosis:  good;  patient would benefit from acute skilled OT services to address these deficits and reach maximum level of function.       Plan:     Patient to be seen 3 x/week to address the above listed problems via self-care/home management, therapeutic activities, therapeutic exercises  · Plan of Care Expires: 12/08/18  · Plan of Care Reviewed with: patient, spouse    This Plan of care has been discussed with the patient who was involved in its development and understands and is in agreement with the identified goals and treatment plan    GOALS:   Multidisciplinary Problems     Occupational Therapy Goals        Problem: Occupational Therapy Goal    Goal Priority Disciplines Outcome Interventions   Occupational Therapy Goal     OT, PT/OT Ongoing (interventions implemented as appropriate)    Description:  Goals to be met by: 11/16/18     Patient will increase functional independence with ADLs by performing:    UE Dressing with [place].  LE Dressing with [place].  Grooming while standing at sink with [place].  Toileting from toilet with [place] for hygiene and clothing management.   Bathing from  shower chair/bench with [place].  Toilet transfer to toilet with [place].  Increased functional strength to WFL for B UE as indicated.  Upper extremity exercise program x15 reps per handout, with independence as indicated.                      Time Tracking:     OT Date of Treatment: 11/12/18  OT Start Time: 0854  OT Stop Time: 0920  OT Total Time (min): 26 min    Billable Minutes:Self Care/Home Management 10  Therapeutic Activity 15    Norma Sierra OT  11/12/2018

## 2018-11-12 NOTE — NURSING
Notified MD Patrick Larry of pt swollen, warm, red right arm, ordered warm compress and DVT US. Pt temp was 98.2F orally.

## 2018-11-12 NOTE — PLAN OF CARE
Problem: Patient Care Overview  Goal: Plan of Care Review  Outcome: Ongoing (interventions implemented as appropriate)  POC reviewed with pt with all questions answered. VSS and pt remains free from pain. Immobilizer in use on R arm. Pt receives 200mg PO amiodarone daily and 2g IVP cefazolin q8hrs. Pt RUE red and swollen. Outline of site noted. No vtach noted during shift. Bed alarm on. Pt advised to call for assistance when exiting bed. Fall bundle implemented and pt remains free from falls. Will continue to monitor.

## 2018-11-12 NOTE — PLAN OF CARE
Problem: Occupational Therapy Goal  Goal: Occupational Therapy Goal  Goals to be met by: 11/16/18     Patient will increase functional independence with ADLs by performing:    UE Dressing with [place].  LE Dressing with [place].  Grooming while standing at sink with [place].  Toileting from toilet with [place] for hygiene and clothing management.   Bathing from  shower chair/bench with [place].  Toilet transfer to toilet with [place].  Increased functional strength to WFL for B UE as indicated.  Upper extremity exercise program x15 reps per handout, with independence as indicated.     Outcome: Ongoing (interventions implemented as appropriate)  Continue POC     Norma Sierra, OTR/L  [phone]  11/12/2018

## 2018-11-12 NOTE — PROGRESS NOTES
[facility]  Cardiology  Progress Note    Patient Name: Donavan Mathews  MRN: [id number]  Admission Date: 11/7/2018  Hospital Length of Stay: 5 days  Code Status: Full Code   Attending Physician: Binta Virk MD   Primary Care Physician: Tim Luis MD  Expected Discharge Date: 11/12/2018  Principal Problem:VT (ventricular tachycardia)    Subjective:     Hospital Course:   Patient admitted for recurrent pleomorphic VT sustaining several shocks from AICD, taken to EP lab for VT ablation. Patient came back from EP lab intubated and on pressors. If was felt that the LV lead was not capturing and needed LV lead revision and venogram. Patient came back from EP lab intubated and on pressors. Patient weaned off pressor support and extubated 11/8 in evening. Patient tolerating post procedurally.    Interval History: Today the patient reports feeling well. He has noted improved energy and exercise tolerance. He does not report any symptoms or concerns at this time.    Review of Systems   Constitution: Negative.   HENT: Negative.    Eyes: Negative.    Cardiovascular: Negative.    Respiratory: Negative.    Endocrine: Negative.    Hematologic/Lymphatic: Negative.    Skin: Negative.    Musculoskeletal: Negative.    Gastrointestinal: Negative.    Genitourinary: Negative.    Neurological: Negative.    Psychiatric/Behavioral: Negative.      Objective:     Vital Signs (Most Recent):  Temp: 97.2 °F (36.2 °C) (11/12/18 1059)  Pulse: 71 (11/12/18 1600)  Resp: 18 (11/12/18 1059)  BP: 107/65 (11/12/18 1059)  SpO2: (!) 91 % (11/12/18 1059) Vital Signs (24h Range):  Temp:  [96.9 °F (36.1 °C)-99.6 °F (37.6 °C)] 97.2 °F (36.2 °C)  Pulse:  [66-80] 71  Resp:  [16-18] 18  SpO2:  [90 %-95 %] 91 %  BP: (104-113)/(64-71) 107/65     Weight: 76.7 kg (169 lb)  Body mass index is 22.92 kg/m².     SpO2: (!) 91 %  O2 Device (Oxygen Therapy): room air      Intake/Output Summary (Last 24 hours) at 11/12/2018 1721  Last data filed  at 11/12/2018 1400  Gross per 24 hour   Intake 900 ml   Output 1350 ml   Net -450 ml       Lines/Drains/Airways     Peripheral Intravenous Line                 Peripheral IV - Single Lumen 11/07/18 1541 Left Antecubital 5 days                Physical Exam   Constitutional: He is oriented to person, place, and time. He appears well-developed and well-nourished. No distress.   HENT:   Head: Normocephalic and atraumatic.   Neck: No JVD present.   Cardiovascular: Normal rate, regular rhythm, normal heart sounds and intact distal pulses. Exam reveals no gallop and no friction rub.   No murmur heard.  Right shoulder ICD pocket with hematoma, stable   Pulmonary/Chest: Effort normal and breath sounds normal. No respiratory distress. He has no wheezes. He has no rales.   Abdominal: Soft. Bowel sounds are normal. He exhibits no distension. There is no tenderness.   Musculoskeletal: He exhibits no edema.   Neurological: He is alert and oriented to person, place, and time.   Skin: He is not diaphoretic.       Significant Labs:     Recent Results (from the past 24 hour(s))   CBC auto differential    Collection Time: 11/12/18  6:23 AM   Result Value Ref Range    WBC 9.28 3.90 - 12.70 K/uL    RBC 4.17 (L) 4.60 - 6.20 M/uL    Hemoglobin 12.8 (L) 14.0 - 18.0 g/dL    Hematocrit 38.5 (L) 40.0 - 54.0 %    MCV 92 82 - 98 fL    MCH 30.7 27.0 - 31.0 pg    MCHC 33.2 32.0 - 36.0 g/dL    RDW 14.6 (H) 11.5 - 14.5 %    Platelets 200 150 - 350 K/uL    MPV 11.2 9.2 - 12.9 fL    Immature Granulocytes 0.5 0.0 - 0.5 %    Gran # (ANC) 7.4 1.8 - 7.7 K/uL    Immature Grans (Abs) 0.05 (H) 0.00 - 0.04 K/uL    Lymph # 0.8 (L) 1.0 - 4.8 K/uL    Mono # 0.9 0.3 - 1.0 K/uL    Eos # 0.2 0.0 - 0.5 K/uL    Baso # 0.02 0.00 - 0.20 K/uL    nRBC 0 0 /100 WBC    Gran% 80.0 (H) 38.0 - 73.0 %    Lymph% 8.1 (L) 18.0 - 48.0 %    Mono% 9.6 4.0 - 15.0 %    Eosinophil% 1.6 0.0 - 8.0 %    Basophil% 0.2 0.0 - 1.9 %    Differential Method Automated    Magnesium    Collection  Time: 11/12/18  6:23 AM   Result Value Ref Range    Magnesium 2.3 1.6 - 2.6 mg/dL   Phosphorus    Collection Time: 11/12/18  6:23 AM   Result Value Ref Range    Phosphorus 3.6 2.7 - 4.5 mg/dL   Protime-INR    Collection Time: 11/12/18  6:23 AM   Result Value Ref Range    Prothrombin Time 34.2 (H) 9.0 - 12.5 sec    INR 3.5 (H) 0.8 - 1.2   Comprehensive metabolic panel    Collection Time: 11/12/18  8:02 AM   Result Value Ref Range    Sodium 138 136 - 145 mmol/L    Potassium 4.2 3.5 - 5.1 mmol/L    Chloride 101 95 - 110 mmol/L    CO2 28 23 - 29 mmol/L    Glucose 96 70 - 110 mg/dL    BUN, Bld 24 (H) 8 - 23 mg/dL    Creatinine 0.9 0.5 - 1.4 mg/dL    Calcium 9.1 8.7 - 10.5 mg/dL    Total Protein 6.8 6.0 - 8.4 g/dL    Albumin 2.9 (L) 3.5 - 5.2 g/dL    Total Bilirubin 1.4 (H) 0.1 - 1.0 mg/dL    Alkaline Phosphatase 105 55 - 135 U/L    AST 34 10 - 40 U/L    ALT 26 10 - 44 U/L    Anion Gap 9 8 - 16 mmol/L    eGFR if African American >60.0 >60 mL/min/1.73 m^2    eGFR if non African American >60.0 >60 mL/min/1.73 m^2   Comprehensive metabolic panel    Collection Time: 11/12/18 12:53 PM   Result Value Ref Range    Sodium 137 136 - 145 mmol/L    Potassium 4.4 3.5 - 5.1 mmol/L    Chloride 100 95 - 110 mmol/L    CO2 27 23 - 29 mmol/L    Glucose 108 70 - 110 mg/dL    BUN, Bld 25 (H) 8 - 23 mg/dL    Creatinine 0.9 0.5 - 1.4 mg/dL    Calcium 9.2 8.7 - 10.5 mg/dL    Total Protein 6.9 6.0 - 8.4 g/dL    Albumin 2.9 (L) 3.5 - 5.2 g/dL    Total Bilirubin 1.2 (H) 0.1 - 1.0 mg/dL    Alkaline Phosphatase 104 55 - 135 U/L    AST 36 10 - 40 U/L    ALT 27 10 - 44 U/L    Anion Gap 10 8 - 16 mmol/L    eGFR if African American >60.0 >60 mL/min/1.73 m^2    eGFR if non African American >60.0 >60 mL/min/1.73 m^2         Significant Imaging:       I have reviewed all pertinent imaging studies from the last 24 hours        Assessment and Plan:       * VT (ventricular tachycardia)    78 yr old man with PMH of persistent Afib, Mixed CM Ef 25% s/p  Medtronic CRTD upgrade 1/17, S/p  Mechanical AVR in 1990, s/p 1v CABG(SVG to RCA) 1990,restrictive lung disease potentially associated to amiodarone (per Dr Aguayo's note), recurrent pleomorphic VTs/p multiple defibrillator shock was referred to EP clinic by Dr Mehta for eval of VT.    -Taken for VT ablation, failed  -Taken for LV lead revision 11/8 with EP  -Continue dig, amiodarone  -Electrolyte replacement PRN (K>4, Mg>2)  -maximize BB as tolerated     A-fib    Continuing home medications at this time:  -Amio 200 BID  -ASA 81mg QD  -Carvediolo - increase dose as tolerated  -Dig 125 mcg QD            Cardiomyopathy    Mixed CMP <20% s/p CRT-D  Global hypokinesis worse in inferior and inferoseptal walls  Mildly dilated LV and RV    Reinitiated home meds: (with exception of Cardizem)  -Amio 200 BID  -ASA 81mg QD  -Carvedilol - increase dose as tolerated  -Dig 125 mcg QD   - Atorvastatin 80mg QD  - Eplerenone 25  -Valsartan with intent to transition to entresto        Hx of CABG    -see above        S/P AVR (aortic valve replacement)    -Pharmacy consult for warfarin dosing  -Continue warfarin, per Dr Aguayo's note from clinics         VTE Risk Mitigation (From admission, onward)        Ordered     warfarin (COUMADIN) tablet 1 mg  Daily      11/12/18 7423          Nehemiah Perez MD  Cardiology  [facility]

## 2018-11-12 NOTE — SUBJECTIVE & OBJECTIVE
Interval History: No events over night. Denies cardiovascular symptoms (angina, dyspnea, palpitations, syncope).  Telemetry: No events      Review of Systems   HENT: Negative for congestion and ear discharge.    Eyes: Negative for blurred vision and discharge.   Cardiovascular: Negative for chest pain and claudication.   Respiratory: Negative for cough and hemoptysis.    Endocrine: Negative for cold intolerance and heat intolerance.     Objective:     Vital Signs (Most Recent):  Temp: 97.2 °F (36.2 °C) (11/12/18 1059)  Pulse: 78 (11/12/18 1059)  Resp: 18 (11/12/18 1059)  BP: 107/65 (11/12/18 1059)  SpO2: (!) 91 % (11/12/18 1059) Vital Signs (24h Range):  Temp:  [96.9 °F (36.1 °C)-99.6 °F (37.6 °C)] 97.2 °F (36.2 °C)  Pulse:  [66-80] 78  Resp:  [14-18] 18  SpO2:  [90 %-95 %] 91 %  BP: ()/(55-71) 107/65     Weight: 76.7 kg (169 lb)  Body mass index is 22.92 kg/m².     SpO2: (!) 91 %  O2 Device (Oxygen Therapy): room air    Physical Exam   Constitutional: He is oriented to person, place, and time. He appears well-developed and well-nourished.   HENT:   Head: Normocephalic and atraumatic.   Nose: Nose normal.   Mouth/Throat: No oropharyngeal exudate.   Eyes: Right eye exhibits no discharge. Left eye exhibits no discharge. No scleral icterus.   Neck: Normal range of motion. Neck supple. No JVD present.   Cardiovascular: Normal rate, regular rhythm, S1 normal and S2 normal. Exam reveals no gallop, no S3, no S4, no distant heart sounds, no friction rub, no midsystolic click and no opening snap.   No murmur heard.  Pulses:       Radial pulses are 2+ on the right side, and 2+ on the left side.        Femoral pulses are 2+ on the right side, and 2+ on the left side.  Pulmonary/Chest: Effort normal and breath sounds normal. No respiratory distress. He has no wheezes. He has no rales. He exhibits no tenderness.   Abdominal: Soft. Bowel sounds are normal. He exhibits no distension. There is no tenderness. There is no  rebound.   Musculoskeletal: Normal range of motion. He exhibits no edema, tenderness or deformity.   Lymphadenopathy:     He has no cervical adenopathy.   Neurological: He is alert and oriented to person, place, and time. No cranial nerve deficit.   Skin: Skin is warm and dry.   RUE ecchymosis extending from forearm to medial arm. R forearm is markedly wider than L   Psychiatric: He has a normal mood and affect. His behavior is normal.       Significant Labs:   Blood Culture: No results for input(s): LABBLOO in the last 48 hours., BMP:   Recent Labs   Lab 11/11/18  0338  11/11/18  1300 11/11/18  1628 11/12/18  0623 11/12/18  0802   GLU  --    < > 138* 106  --  96   NA  --    < > 135* 137  --  138   K  --    < > 4.3 4.3  --  4.2   CL  --    < > 100 101  --  101   CO2  --    < > 25 26  --  28   BUN  --    < > 21 23  --  24*   CREATININE  --    < > 1.1 0.9  --  0.9   CALCIUM  --    < > 8.6* 8.9  --  9.1   MG 2.1  --   --   --  2.3  --     < > = values in this interval not displayed.   , CMP:   Recent Labs   Lab 11/11/18  0705 11/11/18  1300 11/11/18  1628 11/12/18  0802   * 135* 137 138   K 4.2 4.3 4.3 4.2    100 101 101   CO2 26 25 26 28    138* 106 96   BUN 21 21 23 24*   CREATININE 0.9 1.1 0.9 0.9   CALCIUM 8.6* 8.6* 8.9 9.1   PROT 6.0 5.9*  --  6.8   ALBUMIN 2.6* 2.5*  --  2.9*   BILITOT 1.3* 1.0  --  1.4*   ALKPHOS 77 84  --  105   AST 33 31  --  34   ALT 28 26  --  26   ANIONGAP 5* 10 10 9   ESTGFRAFRICA >60.0 >60.0 >60.0 >60.0   EGFRNONAA >60.0 >60.0 >60.0 >60.0    and INR:   Recent Labs   Lab 11/11/18  0338 11/12/18  0623   INR 2.9* 3.5*       Significant Imaging: Telemetry as  above

## 2018-11-12 NOTE — PROGRESS NOTES
[facility]  Cardiac Electrophysiology  Progress Note    Admission Date: 11/7/2018  Code Status: Full Code   Attending Physician: Binta Virk MD   Expected Discharge Date: 11/12/2018  Principal Problem:VT (ventricular tachycardia)    Subjective:     Interval History: No events over night. Denies cardiovascular symptoms (angina, dyspnea, palpitations, syncope).  Telemetry: No events      Review of Systems   HENT: Negative for congestion and ear discharge.    Eyes: Negative for blurred vision and discharge.   Cardiovascular: Negative for chest pain and claudication.   Respiratory: Negative for cough and hemoptysis.    Endocrine: Negative for cold intolerance and heat intolerance.     Objective:     Vital Signs (Most Recent):  Temp: 97.2 °F (36.2 °C) (11/12/18 1059)  Pulse: 78 (11/12/18 1059)  Resp: 18 (11/12/18 1059)  BP: 107/65 (11/12/18 1059)  SpO2: (!) 91 % (11/12/18 1059) Vital Signs (24h Range):  Temp:  [96.9 °F (36.1 °C)-99.6 °F (37.6 °C)] 97.2 °F (36.2 °C)  Pulse:  [66-80] 78  Resp:  [14-18] 18  SpO2:  [90 %-95 %] 91 %  BP: ()/(55-71) 107/65     Weight: 76.7 kg (169 lb)  Body mass index is 22.92 kg/m².     SpO2: (!) 91 %  O2 Device (Oxygen Therapy): room air    Physical Exam   Constitutional: He is oriented to person, place, and time. He appears well-developed and well-nourished.   HENT:   Head: Normocephalic and atraumatic.   Nose: Nose normal.   Mouth/Throat: No oropharyngeal exudate.   Eyes: Right eye exhibits no discharge. Left eye exhibits no discharge. No scleral icterus.   Neck: Normal range of motion. Neck supple. No JVD present.   Cardiovascular: Normal rate, regular rhythm, S1 normal and S2 normal. Exam reveals no gallop, no S3, no S4, no distant heart sounds, no friction rub, no midsystolic click and no opening snap.   No murmur heard.  Pulses:       Radial pulses are 2+ on the right side, and 2+ on the left side.        Femoral pulses are 2+ on the right side, and  2+ on the left side.  Pulmonary/Chest: Effort normal and breath sounds normal. No respiratory distress. He has no wheezes. He has no rales. He exhibits no tenderness.   Abdominal: Soft. Bowel sounds are normal. He exhibits no distension. There is no tenderness. There is no rebound.   Musculoskeletal: Normal range of motion. He exhibits no edema, tenderness or deformity.   Lymphadenopathy:     He has no cervical adenopathy.   Neurological: He is alert and oriented to person, place, and time. No cranial nerve deficit.   Skin: Skin is warm and dry.   RUE ecchymosis extending from forearm to medial arm. R forearm is markedly wider than L. Pocket hematoma decreasing, wound dry, clean.  Psychiatric: He has a normal mood and affect. His behavior is normal.       Significant Labs:   Blood Culture: No results for input(s): LABBLOO in the last 48 hours., BMP:   Recent Labs   Lab 11/11/18  0338  11/11/18  1300 11/11/18  1628 11/12/18  0623 11/12/18  0802   GLU  --    < > 138* 106  --  96   NA  --    < > 135* 137  --  138   K  --    < > 4.3 4.3  --  4.2   CL  --    < > 100 101  --  101   CO2  --    < > 25 26  --  28   BUN  --    < > 21 23  --  24*   CREATININE  --    < > 1.1 0.9  --  0.9   CALCIUM  --    < > 8.6* 8.9  --  9.1   MG 2.1  --   --   --  2.3  --     < > = values in this interval not displayed.   , CMP:   Recent Labs   Lab 11/11/18  0705 11/11/18  1300 11/11/18  1628 11/12/18  0802   * 135* 137 138   K 4.2 4.3 4.3 4.2    100 101 101   CO2 26 25 26 28    138* 106 96   BUN 21 21 23 24*   CREATININE 0.9 1.1 0.9 0.9   CALCIUM 8.6* 8.6* 8.9 9.1   PROT 6.0 5.9*  --  6.8   ALBUMIN 2.6* 2.5*  --  2.9*   BILITOT 1.3* 1.0  --  1.4*   ALKPHOS 77 84  --  105   AST 33 31  --  34   ALT 28 26  --  26   ANIONGAP 5* 10 10 9   ESTGFRAFRICA >60.0 >60.0 >60.0 >60.0   EGFRNONAA >60.0 >60.0 >60.0 >60.0    and INR:   Recent Labs   Lab 11/11/18  0338 11/12/18  0623   INR 2.9* 3.5*       Significant Imaging: Telemetry as   above    Assessment and Plan:     * VT (ventricular tachycardia)    78 yr old man with PMH of persistent Afib, Mixed CM Ef 25% s/p [employer] CRTD upgrade 1/17, S/p  Mechanical AVR in 1990, s/p 1v CABG(SVG to RCA) 1990,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    S/p VT ablation and LV lead replacement this admission     11/11/18:  Episode of tachycardia this morning up to 170-180 bpm. ICD interrogated. Most consistent with likely temporary organization of atrial fibrillation with 2:1 AV conduction (local LV egram and far field egram stored of event match intrinsic QRS, device noted 91% morphology match to intrinsic QRS and withheld therapy, QRS on telemetry extremely similar to native QRS, TCL matched 2x atrial cycle length).     Recommendations:  -Continue dig, amiodarone  -Maximize BB as tolerated  -Continue ppx Abx for a total of five days.  -F/U w Dr Aguayo in clinics  -If still here by Thursday, can get device evaluated by device clinics.    Case discussed w Dr Blanton. We will sign off.         Zachariah Orellana MD  Cardiac Electrophysiology  [facility]

## 2018-11-12 NOTE — SUBJECTIVE & OBJECTIVE
Interval History: Today the patient reports feeling well. He has noted improved energy and exercise tolerance. He does not report any symptoms or concerns at this time.    Review of Systems   Constitution: Negative.   HENT: Negative.    Eyes: Negative.    Cardiovascular: Negative.    Respiratory: Negative.    Endocrine: Negative.    Hematologic/Lymphatic: Negative.    Skin: Negative.    Musculoskeletal: Negative.    Gastrointestinal: Negative.    Genitourinary: Negative.    Neurological: Negative.    Psychiatric/Behavioral: Negative.      Objective:     Vital Signs (Most Recent):  Temp: 97.2 °F (36.2 °C) (11/12/18 1059)  Pulse: 71 (11/12/18 1600)  Resp: 18 (11/12/18 1059)  BP: 107/65 (11/12/18 1059)  SpO2: (!) 91 % (11/12/18 1059) Vital Signs (24h Range):  Temp:  [96.9 °F (36.1 °C)-99.6 °F (37.6 °C)] 97.2 °F (36.2 °C)  Pulse:  [66-80] 71  Resp:  [16-18] 18  SpO2:  [90 %-95 %] 91 %  BP: (104-113)/(64-71) 107/65     Weight: 76.7 kg (169 lb)  Body mass index is 22.92 kg/m².     SpO2: (!) 91 %  O2 Device (Oxygen Therapy): room air      Intake/Output Summary (Last 24 hours) at 11/12/2018 1721  Last data filed at 11/12/2018 1400  Gross per 24 hour   Intake 900 ml   Output 1350 ml   Net -450 ml       Lines/Drains/Airways     Peripheral Intravenous Line                 Peripheral IV - Single Lumen 11/07/18 1541 Left Antecubital 5 days                Physical Exam   Constitutional: He is oriented to person, place, and time. He appears well-developed and well-nourished. No distress.   HENT:   Head: Normocephalic and atraumatic.   Neck: No JVD present.   Cardiovascular: Normal rate, regular rhythm, normal heart sounds and intact distal pulses. Exam reveals no gallop and no friction rub.   No murmur heard.  Right shoulder ICD pocket with hematoma, stable   Pulmonary/Chest: Effort normal and breath sounds normal. No respiratory distress. He has no wheezes. He has no rales.   Abdominal: Soft. Bowel sounds are normal. He exhibits  no distension. There is no tenderness.   Musculoskeletal: He exhibits no edema.   Neurological: He is alert and oriented to person, place, and time.   Skin: He is not diaphoretic.       Significant Labs:     Recent Results (from the past 24 hour(s))   CBC auto differential    Collection Time: 11/12/18  6:23 AM   Result Value Ref Range    WBC 9.28 3.90 - 12.70 K/uL    RBC 4.17 (L) 4.60 - 6.20 M/uL    Hemoglobin 12.8 (L) 14.0 - 18.0 g/dL    Hematocrit 38.5 (L) 40.0 - 54.0 %    MCV 92 82 - 98 fL    MCH 30.7 27.0 - 31.0 pg    MCHC 33.2 32.0 - 36.0 g/dL    RDW 14.6 (H) 11.5 - 14.5 %    Platelets 200 150 - 350 K/uL    MPV 11.2 9.2 - 12.9 fL    Immature Granulocytes 0.5 0.0 - 0.5 %    Gran # (ANC) 7.4 1.8 - 7.7 K/uL    Immature Grans (Abs) 0.05 (H) 0.00 - 0.04 K/uL    Lymph # 0.8 (L) 1.0 - 4.8 K/uL    Mono # 0.9 0.3 - 1.0 K/uL    Eos # 0.2 0.0 - 0.5 K/uL    Baso # 0.02 0.00 - 0.20 K/uL    nRBC 0 0 /100 WBC    Gran% 80.0 (H) 38.0 - 73.0 %    Lymph% 8.1 (L) 18.0 - 48.0 %    Mono% 9.6 4.0 - 15.0 %    Eosinophil% 1.6 0.0 - 8.0 %    Basophil% 0.2 0.0 - 1.9 %    Differential Method Automated    Magnesium    Collection Time: 11/12/18  6:23 AM   Result Value Ref Range    Magnesium 2.3 1.6 - 2.6 mg/dL   Phosphorus    Collection Time: 11/12/18  6:23 AM   Result Value Ref Range    Phosphorus 3.6 2.7 - 4.5 mg/dL   Protime-INR    Collection Time: 11/12/18  6:23 AM   Result Value Ref Range    Prothrombin Time 34.2 (H) 9.0 - 12.5 sec    INR 3.5 (H) 0.8 - 1.2   Comprehensive metabolic panel    Collection Time: 11/12/18  8:02 AM   Result Value Ref Range    Sodium 138 136 - 145 mmol/L    Potassium 4.2 3.5 - 5.1 mmol/L    Chloride 101 95 - 110 mmol/L    CO2 28 23 - 29 mmol/L    Glucose 96 70 - 110 mg/dL    BUN, Bld 24 (H) 8 - 23 mg/dL    Creatinine 0.9 0.5 - 1.4 mg/dL    Calcium 9.1 8.7 - 10.5 mg/dL    Total Protein 6.8 6.0 - 8.4 g/dL    Albumin 2.9 (L) 3.5 - 5.2 g/dL    Total Bilirubin 1.4 (H) 0.1 - 1.0 mg/dL    Alkaline Phosphatase 105 55  - 135 U/L    AST 34 10 - 40 U/L    ALT 26 10 - 44 U/L    Anion Gap 9 8 - 16 mmol/L    eGFR if African American >60.0 >60 mL/min/1.73 m^2    eGFR if non African American >60.0 >60 mL/min/1.73 m^2   Comprehensive metabolic panel    Collection Time: 11/12/18 12:53 PM   Result Value Ref Range    Sodium 137 136 - 145 mmol/L    Potassium 4.4 3.5 - 5.1 mmol/L    Chloride 100 95 - 110 mmol/L    CO2 27 23 - 29 mmol/L    Glucose 108 70 - 110 mg/dL    BUN, Bld 25 (H) 8 - 23 mg/dL    Creatinine 0.9 0.5 - 1.4 mg/dL    Calcium 9.2 8.7 - 10.5 mg/dL    Total Protein 6.9 6.0 - 8.4 g/dL    Albumin 2.9 (L) 3.5 - 5.2 g/dL    Total Bilirubin 1.2 (H) 0.1 - 1.0 mg/dL    Alkaline Phosphatase 104 55 - 135 U/L    AST 36 10 - 40 U/L    ALT 27 10 - 44 U/L    Anion Gap 10 8 - 16 mmol/L    eGFR if African American >60.0 >60 mL/min/1.73 m^2    eGFR if non African American >60.0 >60 mL/min/1.73 m^2         Significant Imaging:       I have reviewed all pertinent imaging studies from the last 24 hours

## 2018-11-13 VITALS
HEART RATE: 72 BPM | HEIGHT: 72 IN | SYSTOLIC BLOOD PRESSURE: 105 MMHG | BODY MASS INDEX: 22.89 KG/M2 | RESPIRATION RATE: 18 BRPM | OXYGEN SATURATION: 92 % | WEIGHT: 169 LBS | DIASTOLIC BLOOD PRESSURE: 70 MMHG | TEMPERATURE: 97 F

## 2018-11-13 DIAGNOSIS — Z95.810 PRESENCE OF AUTOMATIC CARDIOVERTER/DEFIBRILLATOR (AICD): Primary | ICD-10-CM

## 2018-11-13 DIAGNOSIS — I47.20 VT (VENTRICULAR TACHYCARDIA): ICD-10-CM

## 2018-11-13 LAB
ALBUMIN SERPL BCP-MCNC: 2.8 G/DL
ALBUMIN SERPL BCP-MCNC: 3 G/DL
ALP SERPL-CCNC: 100 U/L
ALP SERPL-CCNC: 111 U/L
ALT SERPL W/O P-5'-P-CCNC: 24 U/L
ALT SERPL W/O P-5'-P-CCNC: 26 U/L
ANION GAP SERPL CALC-SCNC: 7 MMOL/L
ANION GAP SERPL CALC-SCNC: 9 MMOL/L
AST SERPL-CCNC: 34 U/L
AST SERPL-CCNC: 36 U/L
BASOPHILS # BLD AUTO: 0.03 K/UL
BASOPHILS NFR BLD: 0.4 %
BILIRUB SERPL-MCNC: 1.6 MG/DL
BILIRUB SERPL-MCNC: 1.7 MG/DL
BUN SERPL-MCNC: 26 MG/DL
BUN SERPL-MCNC: 26 MG/DL
CALCIUM SERPL-MCNC: 9 MG/DL
CALCIUM SERPL-MCNC: 9.3 MG/DL
CHLORIDE SERPL-SCNC: 101 MMOL/L
CHLORIDE SERPL-SCNC: 102 MMOL/L
CO2 SERPL-SCNC: 26 MMOL/L
CO2 SERPL-SCNC: 28 MMOL/L
CREAT SERPL-MCNC: 1 MG/DL
CREAT SERPL-MCNC: 1 MG/DL
DIFFERENTIAL METHOD: ABNORMAL
EOSINOPHIL # BLD AUTO: 0.1 K/UL
EOSINOPHIL NFR BLD: 1.6 %
ERYTHROCYTE [DISTWIDTH] IN BLOOD BY AUTOMATED COUNT: 14.4 %
EST. GFR  (AFRICAN AMERICAN): >60 ML/MIN/1.73 M^2
EST. GFR  (AFRICAN AMERICAN): >60 ML/MIN/1.73 M^2
EST. GFR  (NON AFRICAN AMERICAN): >60 ML/MIN/1.73 M^2
EST. GFR  (NON AFRICAN AMERICAN): >60 ML/MIN/1.73 M^2
GLUCOSE SERPL-MCNC: 135 MG/DL
GLUCOSE SERPL-MCNC: 166 MG/DL
HCT VFR BLD AUTO: 36.1 %
HGB BLD-MCNC: 12.2 G/DL
IMM GRANULOCYTES # BLD AUTO: 0.03 K/UL
IMM GRANULOCYTES NFR BLD AUTO: 0.4 %
INR PPP: 2.9
LYMPHOCYTES # BLD AUTO: 0.6 K/UL
LYMPHOCYTES NFR BLD: 8.3 %
MAGNESIUM SERPL-MCNC: 2.3 MG/DL
MCH RBC QN AUTO: 30.8 PG
MCHC RBC AUTO-ENTMCNC: 33.8 G/DL
MCV RBC AUTO: 91 FL
MONOCYTES # BLD AUTO: 0.8 K/UL
MONOCYTES NFR BLD: 10.5 %
NEUTROPHILS # BLD AUTO: 6 K/UL
NEUTROPHILS NFR BLD: 78.8 %
NRBC BLD-RTO: 0 /100 WBC
PHOSPHATE SERPL-MCNC: 3.5 MG/DL
PLATELET # BLD AUTO: 182 K/UL
PMV BLD AUTO: 10.8 FL
POTASSIUM SERPL-SCNC: 4.8 MMOL/L
POTASSIUM SERPL-SCNC: 5 MMOL/L
PROT SERPL-MCNC: 6.5 G/DL
PROT SERPL-MCNC: 7.1 G/DL
PROTHROMBIN TIME: 27.8 SEC
RBC # BLD AUTO: 3.96 M/UL
SODIUM SERPL-SCNC: 136 MMOL/L
SODIUM SERPL-SCNC: 137 MMOL/L
WBC # BLD AUTO: 7.61 K/UL

## 2018-11-13 PROCEDURE — 85610 PROTHROMBIN TIME: CPT

## 2018-11-13 PROCEDURE — 83735 ASSAY OF MAGNESIUM: CPT

## 2018-11-13 PROCEDURE — 36415 COLL VENOUS BLD VENIPUNCTURE: CPT

## 2018-11-13 PROCEDURE — 25000003 PHARM REV CODE 250: Performed by: INTERNAL MEDICINE

## 2018-11-13 PROCEDURE — 99239 HOSP IP/OBS DSCHRG MGMT >30: CPT | Mod: GC,,, | Performed by: INTERNAL MEDICINE

## 2018-11-13 PROCEDURE — 25000003 PHARM REV CODE 250: Performed by: STUDENT IN AN ORGANIZED HEALTH CARE EDUCATION/TRAINING PROGRAM

## 2018-11-13 PROCEDURE — 80053 COMPREHEN METABOLIC PANEL: CPT | Mod: 91

## 2018-11-13 PROCEDURE — 85025 COMPLETE CBC W/AUTO DIFF WBC: CPT

## 2018-11-13 PROCEDURE — 84100 ASSAY OF PHOSPHORUS: CPT

## 2018-11-13 RX ORDER — FUROSEMIDE 20 MG/1
20 TABLET ORAL DAILY
Qty: 45 TABLET | Refills: 1 | Status: SHIPPED | OUTPATIENT
Start: 2018-11-13 | End: 2019-09-09

## 2018-11-13 RX ORDER — VALSARTAN 40 MG/1
40 TABLET ORAL DAILY
Qty: 30 TABLET | Refills: 1 | Status: SHIPPED | OUTPATIENT
Start: 2018-11-13 | End: 2019-11-13

## 2018-11-13 RX ORDER — WARFARIN 2 MG/1
2 TABLET ORAL DAILY
Status: DISCONTINUED | OUTPATIENT
Start: 2018-11-13 | End: 2018-11-13 | Stop reason: HOSPADM

## 2018-11-13 RX ORDER — CEFADROXIL 500 MG/1
500 CAPSULE ORAL EVERY 12 HOURS
Qty: 8 CAPSULE | Refills: 0 | Status: SHIPPED | OUTPATIENT
Start: 2018-11-13 | End: 2018-11-17

## 2018-11-13 RX ORDER — NAPROXEN SODIUM 220 MG/1
81 TABLET, FILM COATED ORAL DAILY
Refills: 0 | COMMUNITY
Start: 2018-11-13 | End: 2019-11-13

## 2018-11-13 RX ORDER — METOPROLOL SUCCINATE 100 MG/1
100 TABLET, EXTENDED RELEASE ORAL 2 TIMES DAILY
Status: DISCONTINUED | OUTPATIENT
Start: 2018-11-13 | End: 2018-11-13 | Stop reason: HOSPADM

## 2018-11-13 RX ORDER — METOPROLOL SUCCINATE 100 MG/1
100 TABLET, EXTENDED RELEASE ORAL 2 TIMES DAILY
Qty: 60 TABLET | Refills: 1 | Status: SHIPPED | OUTPATIENT
Start: 2018-11-13 | End: 2019-11-13

## 2018-11-13 RX ADMIN — FUROSEMIDE 20 MG: 20 TABLET ORAL at 10:11

## 2018-11-13 RX ADMIN — PAROXETINE HYDROCHLORIDE 20 MG: 20 TABLET, FILM COATED ORAL at 10:11

## 2018-11-13 RX ADMIN — EPLERENONE 25 MG: 25 TABLET ORAL at 10:11

## 2018-11-13 RX ADMIN — ASPIRIN 81 MG CHEWABLE TABLET 81 MG: 81 TABLET CHEWABLE at 10:11

## 2018-11-13 RX ADMIN — DIGOXIN 125 MCG: 125 TABLET ORAL at 10:11

## 2018-11-13 RX ADMIN — LEVOTHYROXINE SODIUM 75 MCG: 75 TABLET ORAL at 10:11

## 2018-11-13 RX ADMIN — METOPROLOL SUCCINATE 100 MG: 100 TABLET, EXTENDED RELEASE ORAL at 10:11

## 2018-11-13 RX ADMIN — CEFADROXIL 500 MG: 500 CAPSULE ORAL at 10:11

## 2018-11-13 RX ADMIN — VALSARTAN 20 MG: 40 TABLET, FILM COATED ORAL at 10:11

## 2018-11-13 RX ADMIN — AMIODARONE HYDROCHLORIDE 200 MG: 200 TABLET ORAL at 10:11
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
## 2018-11-13 NOTE — ASSESSMENT & PLAN NOTE
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    -Taken for VT ablation, failed  -Taken for LV lead revision 11/8 with EP  -Continues to have PVCs and NSVT (2-4 beats)    EP recs:  -Continue dig, amiodarone  -Electrolyte replacement PRN (K>4, Mg>2)  -Toprol 100mg BID (max titrated today 11/13)

## 2018-11-13 NOTE — ASSESSMENT & PLAN NOTE
Mixed CMP <20% s/p CRT-D  Global hypokinesis worse in inferior and inferoseptal walls  Mildly dilated LV and RV    Reinitiated home meds: (with exception of Cardizem)  -Amio 200 BID  -ASA 81mg QD  -Toprol XL 100mg BID (uptitrated to max today 11/13)  -Dig 125 mcg QD   - Atorvastatin 80mg QD  - Eplerenone 25  - Valsartan

## 2018-11-13 NOTE — PLAN OF CARE
Problem: Patient Care Overview  Goal: Plan of Care Review  Outcome: Ongoing (interventions implemented as appropriate)  Reviewed plan of care, verbalized understanding  Answered questions  Pt remained free from falls  Pain controlled, pt prefers tylenol when necessary  Ice on right shoulder for discomfort from procedure  Bilateral groin sites free from hematoma, and dressings CDI  Will continue to monitior

## 2018-11-13 NOTE — NURSING
Pt d/c home per MD order. Telemetry removed. Telemetry room notified. IV access removed and intact x1. VSS. No distress noted. No complaints noted at this time. Pt given and explained medication list and prescriptions. Pt verbalizes complete understanding of all discharge instructions and follow up care. Pt given printed AVS. AVS confirmation completed. Family at bedside.  Awaiting escort. Will continue to monitor.

## 2018-11-13 NOTE — ASSESSMENT & PLAN NOTE
-on warfarin   -trend levels daily and adjust for goal INR 2.0-3.0    Lab Results   Component Value Date    INR 2.9 (H) 11/13/2018    INR 3.5 (H) 11/12/2018    INR 2.9 (H) 11/11/2018

## 2018-11-13 NOTE — PLAN OF CARE
Problem: Patient Care Overview  Goal: Plan of Care Review  Outcome: Ongoing (interventions implemented as appropriate)  Pt is free of falls and injuries. Skin remains clean, dry, and intact. Pt educated on pt safety, care provided, and medication administered. Pt is free of c/o SOB or chest pains. Pt is possibly d/c 11/13/18.  Reviewed plan of care with pt. Pt verbalized understanding. Pt is AAOX4. Vital signs are stable. Pt is in no distress, will continue to monitor.

## 2018-11-13 NOTE — ASSESSMENT & PLAN NOTE
Continuing home medications at this time:  -Amio 200 BID  -ASA 81mg QD  -Toprol  mg BID (Max titrated today 11/13)  -Dig 125 mcg QD

## 2018-11-13 NOTE — ASSESSMENT & PLAN NOTE
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    -Taken for VT ablation, failed  -Taken for LV lead revision 11/8 with EP  -Continues to have PVCs and NSVT (2-4 beats)    Plan:  -Continue dig, amiodarone  -Electrolyte replacement PRN (K>4, Mg>2)  -Toprol 100mg BID (max titrated today 11/13)
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
## 2018-11-13 NOTE — SUBJECTIVE & OBJECTIVE
Interval History: Reports feeling back to baseline. Increasing Toprol XL to 100mg BID today. If tolerates well, can discharge later today    Review of Systems   Constitution: Negative.   HENT: Negative.    Eyes: Negative.    Cardiovascular: Negative.    Respiratory: Negative.    Endocrine: Negative.    Hematologic/Lymphatic: Negative.    Skin: Negative.    Musculoskeletal: Negative.    Gastrointestinal: Negative.    Genitourinary: Negative.    Neurological: Negative.    Psychiatric/Behavioral: Negative.      Objective:     Vital Signs (Most Recent):  Temp: 97.6 °F (36.4 °C) (11/13/18 0835)  Pulse: 69 (11/13/18 0835)  Resp: 16 (11/12/18 2104)  BP: 97/62 (11/13/18 0835)  SpO2: 96 % (11/13/18 0835) Vital Signs (24h Range):  Temp:  [97.2 °F (36.2 °C)-98.3 °F (36.8 °C)] 97.6 °F (36.4 °C)  Pulse:  [68-78] 69  Resp:  [16-18] 16  SpO2:  [90 %-97 %] 96 %  BP: ()/(61-70) 97/62     Weight: 76.7 kg (169 lb)  Body mass index is 22.92 kg/m².     SpO2: 96 %  O2 Device (Oxygen Therapy): room air      Intake/Output Summary (Last 24 hours) at 11/13/2018 1026  Last data filed at 11/13/2018 0500  Gross per 24 hour   Intake 1860 ml   Output 1875 ml   Net -15 ml       Lines/Drains/Airways     Peripheral Intravenous Line                 Peripheral IV - Single Lumen 11/07/18 1541 Left Antecubital 5 days                Physical Exam   Constitutional: He is oriented to person, place, and time. He appears well-developed and well-nourished. No distress.   HENT:   Head: Normocephalic and atraumatic.   Neck: No JVD present.   Cardiovascular: Normal rate, regular rhythm, normal heart sounds and intact distal pulses. Exam reveals no gallop and no friction rub.   No murmur heard.  Right shoulder ICD pocket with hematoma, stable   Pulmonary/Chest: Effort normal and breath sounds normal. No respiratory distress. He has no wheezes. He has no rales.   Abdominal: Soft. Bowel sounds are normal. He exhibits no distension. There is no tenderness.    Musculoskeletal: He exhibits no edema.   Neurological: He is alert and oriented to person, place, and time.   Skin: He is not diaphoretic.       Significant Labs:     Recent Results (from the past 24 hour(s))   Comprehensive metabolic panel    Collection Time: 11/12/18 12:53 PM   Result Value Ref Range    Sodium 137 136 - 145 mmol/L    Potassium 4.4 3.5 - 5.1 mmol/L    Chloride 100 95 - 110 mmol/L    CO2 27 23 - 29 mmol/L    Glucose 108 70 - 110 mg/dL    BUN, Bld 25 (H) 8 - 23 mg/dL    Creatinine 0.9 0.5 - 1.4 mg/dL    Calcium 9.2 8.7 - 10.5 mg/dL    Total Protein 6.9 6.0 - 8.4 g/dL    Albumin 2.9 (L) 3.5 - 5.2 g/dL    Total Bilirubin 1.2 (H) 0.1 - 1.0 mg/dL    Alkaline Phosphatase 104 55 - 135 U/L    AST 36 10 - 40 U/L    ALT 27 10 - 44 U/L    Anion Gap 10 8 - 16 mmol/L    eGFR if African American >60.0 >60 mL/min/1.73 m^2    eGFR if non African American >60.0 >60 mL/min/1.73 m^2   Comprehensive metabolic panel    Collection Time: 11/12/18  5:38 PM   Result Value Ref Range    Sodium 137 136 - 145 mmol/L    Potassium 4.3 3.5 - 5.1 mmol/L    Chloride 101 95 - 110 mmol/L    CO2 26 23 - 29 mmol/L    Glucose 81 70 - 110 mg/dL    BUN, Bld 26 (H) 8 - 23 mg/dL    Creatinine 0.9 0.5 - 1.4 mg/dL    Calcium 8.8 8.7 - 10.5 mg/dL    Total Protein 6.3 6.0 - 8.4 g/dL    Albumin 2.7 (L) 3.5 - 5.2 g/dL    Total Bilirubin 1.1 (H) 0.1 - 1.0 mg/dL    Alkaline Phosphatase 101 55 - 135 U/L    AST 38 10 - 40 U/L    ALT 27 10 - 44 U/L    Anion Gap 10 8 - 16 mmol/L    eGFR if African American >60.0 >60 mL/min/1.73 m^2    eGFR if non African American >60.0 >60 mL/min/1.73 m^2   CBC auto differential    Collection Time: 11/13/18  7:37 AM   Result Value Ref Range    WBC 7.61 3.90 - 12.70 K/uL    RBC 3.96 (L) 4.60 - 6.20 M/uL    Hemoglobin 12.2 (L) 14.0 - 18.0 g/dL    Hematocrit 36.1 (L) 40.0 - 54.0 %    MCV 91 82 - 98 fL    MCH 30.8 27.0 - 31.0 pg    MCHC 33.8 32.0 - 36.0 g/dL    RDW 14.4 11.5 - 14.5 %    Platelets 182 150 - 350 K/uL    MPV  10.8 9.2 - 12.9 fL    Immature Granulocytes 0.4 0.0 - 0.5 %    Gran # (ANC) 6.0 1.8 - 7.7 K/uL    Immature Grans (Abs) 0.03 0.00 - 0.04 K/uL    Lymph # 0.6 (L) 1.0 - 4.8 K/uL    Mono # 0.8 0.3 - 1.0 K/uL    Eos # 0.1 0.0 - 0.5 K/uL    Baso # 0.03 0.00 - 0.20 K/uL    nRBC 0 0 /100 WBC    Gran% 78.8 (H) 38.0 - 73.0 %    Lymph% 8.3 (L) 18.0 - 48.0 %    Mono% 10.5 4.0 - 15.0 %    Eosinophil% 1.6 0.0 - 8.0 %    Basophil% 0.4 0.0 - 1.9 %    Differential Method Automated    Magnesium    Collection Time: 11/13/18  7:37 AM   Result Value Ref Range    Magnesium 2.3 1.6 - 2.6 mg/dL   Phosphorus    Collection Time: 11/13/18  7:37 AM   Result Value Ref Range    Phosphorus 3.5 2.7 - 4.5 mg/dL   Protime-INR    Collection Time: 11/13/18  7:37 AM   Result Value Ref Range    Prothrombin Time 27.8 (H) 9.0 - 12.5 sec    INR 2.9 (H) 0.8 - 1.2   Comprehensive metabolic panel    Collection Time: 11/13/18  9:32 AM   Result Value Ref Range    Sodium 136 136 - 145 mmol/L    Potassium 5.0 3.5 - 5.1 mmol/L    Chloride 101 95 - 110 mmol/L    CO2 28 23 - 29 mmol/L    Glucose 166 (H) 70 - 110 mg/dL    BUN, Bld 26 (H) 8 - 23 mg/dL    Creatinine 1.0 0.5 - 1.4 mg/dL    Calcium 9.0 8.7 - 10.5 mg/dL    Total Protein 6.5 6.0 - 8.4 g/dL    Albumin 2.8 (L) 3.5 - 5.2 g/dL    Total Bilirubin 1.6 (H) 0.1 - 1.0 mg/dL    Alkaline Phosphatase 100 55 - 135 U/L    AST 34 10 - 40 U/L    ALT 24 10 - 44 U/L    Anion Gap 7 (L) 8 - 16 mmol/L    eGFR if African American >60.0 >60 mL/min/1.73 m^2    eGFR if non African American >60.0 >60 mL/min/1.73 m^2         Significant Imaging:       I have reviewed all pertinent imaging studies from the last 24 hours

## 2018-11-15 ENCOUNTER — PATIENT OUTREACH (OUTPATIENT)
Dept: ADMINISTRATIVE | Facility: CLINIC | Age: 78
End: 2018-11-15

## 2018-11-15 NOTE — PT/OT/SLP DISCHARGE
Physical Therapy Discharge Summary    Name: Donavan Mathews  MRN: [id number]   Principal Problem: VT (ventricular tachycardia)     Patient Discharged from acute Physical Therapy on 2018.  Please refer to prior PT noted date on 2018 for functional status.     Assessment:     Patient was discharged unexpectedly.  Information required to complete an accurate discharge summary is unknown.  Refer to therapy initial evaluation and last progress note for initial and most recent functional status and goal achievement.  Recommendations made may be found in medical record.    Objective:     GOALS:   Multidisciplinary Problems     Physical Therapy Goals     Not on file          Multidisciplinary Problems (Resolved)        Problem: Physical Therapy Goal    Goal Priority Disciplines Outcome Goal Variances Interventions   Physical Therapy Goal   (Resolved)     PT, PT/OT Outcome(s) achieved     Description:  Goals to be met by:      Patient will increase functional independence with mobility by performin. Supine to sit with [place]  2. Sit to stand transfer with [place]  3. Gait  x 250 feet with Supervision using no AD   4. Lower extremity exercise program x20 reps per handout, with independence                      Reasons for Discontinuation of Therapy Services  Transfer to alternate level of care.      Plan:     Patient Discharged to: Home no PT services needed.    Lesia Sim, PT  11/15/2018

## 2018-11-15 NOTE — PATIENT INSTRUCTIONS
Scar Revision Surgery  A scar is a sola left after a wound has healed. Scar revision surgery can help improve the look of a scar or make it less visible. If the scar is tight and restricts movement of the skin, revision can improve this. No scar can be completely erased. But scar revision surgery can help a scar look and feel better.  Types of surgery  Scar revision surgery can be done in a number of ways. The method used depends on the type of scar you have and where it is on your body. You and your doctor will discuss these details before the surgery. Common methods of scar revision surgery include:  · Skin graft. Scar tissue is removed. It is then replaced with a piece of healthy skin (graft) taken from another part of the body.   · Z-plasty. A Z-shaped incision is made through the scar tissue and some healthy skin. (If the scar is very large, more than one incision may be made.) The Z-shape creates pointed skin flaps that can then be arranged for a more pleasing look and contour.  Preparing for surgery  Prepare for the surgery as you have been told. In addition:  · Tell your doctor about all medicines you take. This includes herbs and other supplements. It also includes any blood thinners, such as warfarin, clopidogrel, or daily aspirin. You may need to stop taking some or all of them before surgery.  · Follow any directions you are given for not eating or drinking before surgery.  The day of surgery  The surgery takes 2 to 4 hours. You may go home the same day. Or you may stay 1 or more nights.  Before the surgery begins:  · An IV line is put into a vein in your arm or hand. This line delivers fluids and medicines.  · You will be given medicine (anesthesia) to keep you pain free during the surgery. You may receive sedation, which makes you relaxed and sleepy. Local anesthesia also used to numb the surgical area. In certain cases, general anesthesia is used instead. This puts you into a state like deep sleep  during the surgery. With general anesthesia, a tube may be inserted into your throat to help you breathe. The anesthesiologist will discuss your options with you.  During the surgery:  · Your doctor will repair the scar using the method discussed with you prior to the surgery.  · You will be taken to a recovery room to wake up from the anesthesia. You may be sleepy and nauseated. If a breathing tube was used, your throat may be sore at first. You will be given medicine to manage pain. If the revision is to a large area, you may stay one or more nights in the hospital. Once you are ready to go home, have an adult family member or friend drive you.  Recovering at home  Once at home, follow the instructions you have been given. Your doctor will tell you when you can return to your normal routine. To help your healing:  · Take any prescribed medicines exactly as directed.  · If advised by your doctor, use a cold pack wrapped in a thin towel to relieve discomfort and control swelling. Its important not to leave the cold pack on for too long, or your skin could be damaged. Put the pack over your bandages for no more than 10 minutes at a time. Then, leave it off for at least 20 minutes. Repeat this as often as needed during waking hours until swelling starts to improve. Dont fall asleep with the cold pack on. If youre not sure how to safely use the cold pack, ask your doctor.  · Follow all instructions from your doctor for taking care of the incision. Leave the bandage in place until you are told to remove or change it. Once you can change your bandage, do so every 24 hours or as directed. Also replace the bandage whenever it gets wet or dirty. Wash your hands before changing the bandage.  · Keep the surgical site clean and dry. You can get it wet after 48 hours. Rinse the surgical site gently and pat it dry.  · Keep the surgical site out of the sun as it heals. This will help ensure a better outcome. At first, cover  the healing skin with a bandage or clothing. When your doctor says its okay, you can use sunscreen on the site.  When to call your doctor  Call your doctor if you have any of the following:  · Chest pain or trouble breathing (call 911 or other emergency service)  · A fever of 100.4°F (38.0°C) or higher (or as directed by your doctor)  · Symptoms of infection at an incision site, such as increased redness or swelling, warmth, worsening pain, or foul-smelling drainage  · Bleeding or drainage from the incision  · Pain that is not relieved by medication   Follow-up  You will have follow-up visits so your doctor can see how well youre healing. During these visits, your doctor can monitor the results of your surgery. Let your doctor know if you have any questions or concerns.  Risks and complications  Risks and possible complications include:  · Bleeding  · Infection  · Blood clots, which can travel to the lungs, heart, or brain  · Damage to nerves, muscles, or blood vessels  · Changes in skin sensitivity  · Skin discoloration  · Not liking look of revised scar  · Recurrence of a keloid scar  · Risks of anesthesia (the anesthesiologist will discuss these with you)   Date Last Reviewed: 7/15/2015  © 0226-7463 [employer]. [address] [place] [address], [place] [address]. All rights reserved. This information is not intended as a substitute for professional medical care. Always follow your healthcare professional's instructions.

## 2018-11-24 NOTE — PT/OT/SLP DISCHARGE
Occupational Therapy Discharge Summary    Donavan Mathews  MRN: [id number]   Principal Problem: VT (ventricular tachycardia)      Patient Discharged from acute Occupational Therapy on 11/13/18.  Please refer to prior OT note dated 11/11/18 for functional status.    Assessment:      Patient was discharged unexpectedly.  Information required to complete an accurate discharge summary is unknown.  Refer to therapy initial evaluation and last progress note for initial and most recent functional status and goal achievement.  Recommendations made may be found in medical record.    Objective:     GOALS:   Multidisciplinary Problems     Occupational Therapy Goals     Not on file          Multidisciplinary Problems (Resolved)        Problem: Occupational Therapy Goal    Goal Priority Disciplines Outcome Interventions   Occupational Therapy Goal   (Resolved)     OT, PT/OT Outcome(s) achieved    Description:  Goals to be met by: 11/16/18     Patient will increase functional independence with ADLs by performing:    UE Dressing with [place].  LE Dressing with [place].  Grooming while standing at sink with [place].  Toileting from toilet with [place] for hygiene and clothing management.   Bathing from  shower chair/bench with [place].  Toilet transfer to toilet with [place].  Increased functional strength to WFL for B UE as indicated.  Upper extremity exercise program x15 reps per handout, with independence as indicated.                      Reasons for Discontinuation of Therapy Services  Transfer to alternate level of care.      Plan:     Patient Discharged to: Home no OT services needed    Norma Sierra OT  11/24/2018

## 2018-11-27 ENCOUNTER — TELEPHONE (OUTPATIENT)
Dept: ELECTROPHYSIOLOGY | Facility: CLINIC | Age: 78
End: 2018-11-27

## 2018-11-27 NOTE — TELEPHONE ENCOUNTER
Received call from Pt's wife today. Pt had a VT ablation on 11/08/18. She noted that he has stiches in both groins. She was wondering if they need to be taken out or if they dissolve.She denies any fever, tenderness or redness to the groin area.  Discussed with Dr Aguayo and he stated the patient could come in to get removed or have local PCP take the stiches out.      Called Pt's wife and advised. Offered her an appt tom for 2pm. They live 4 hours away and she stated she would check to see if his PCP would remove and call me back.    Pt returned call and he stated his PCP will remove in the morning. I apologized for the inconvenience and he stated no problem at all. I feel better then I ever have. Expressed that was great to hear. Advised him  to call if we could be of any further assistance.

## 2018-12-04 LAB
POC ACTIVATED CLOTTING TIME K: 158 SEC (ref 74–137)
POC ACTIVATED CLOTTING TIME K: 241 SEC (ref 74–137)
POC ACTIVATED CLOTTING TIME K: 257 SEC (ref 74–137)
POC ACTIVATED CLOTTING TIME K: 268 SEC (ref 74–137)
POC ACTIVATED CLOTTING TIME K: 279 SEC (ref 74–137)
POC ACTIVATED CLOTTING TIME K: 296 SEC (ref 74–137)
POC ACTIVATED CLOTTING TIME K: 307 SEC (ref 74–137)
SAMPLE: ABNORMAL

## 2019-02-18 ENCOUNTER — OFFICE VISIT (OUTPATIENT)
Dept: ELECTROPHYSIOLOGY | Facility: CLINIC | Age: 79
End: 2019-02-18
Payer: MEDICARE

## 2019-02-18 ENCOUNTER — HOSPITAL ENCOUNTER (OUTPATIENT)
Dept: CARDIOLOGY | Facility: CLINIC | Age: 79
Discharge: HOME | End: 2019-02-18
Payer: MEDICARE

## 2019-02-18 ENCOUNTER — CLINICAL SUPPORT (OUTPATIENT)
Dept: CARDIOLOGY | Facility: HOSPITAL | Age: 79
End: 2019-02-18
Attending: INTERNAL MEDICINE
Payer: MEDICARE

## 2019-02-18 VITALS
DIASTOLIC BLOOD PRESSURE: 68 MMHG | HEART RATE: 88 BPM | SYSTOLIC BLOOD PRESSURE: 110 MMHG | WEIGHT: 169.31 LBS | HEIGHT: 72 IN | BODY MASS INDEX: 22.93 KG/M2

## 2019-02-18 DIAGNOSIS — Z95.810 PRESENCE OF AUTOMATIC CARDIOVERTER/DEFIBRILLATOR (AICD): ICD-10-CM

## 2019-02-18 DIAGNOSIS — I47.20 VT (VENTRICULAR TACHYCARDIA): ICD-10-CM

## 2019-02-18 DIAGNOSIS — Z95.1 HX OF CABG: ICD-10-CM

## 2019-02-18 DIAGNOSIS — Z86.79 S/P ABLATION OF VENTRICULAR ARRHYTHMIA: ICD-10-CM

## 2019-02-18 DIAGNOSIS — I47.20 VENTRICULAR TACHYCARDIA: ICD-10-CM

## 2019-02-18 DIAGNOSIS — I48.19 PERSISTENT ATRIAL FIBRILLATION: ICD-10-CM

## 2019-02-18 DIAGNOSIS — I47.20 VT (VENTRICULAR TACHYCARDIA): Primary | ICD-10-CM

## 2019-02-18 DIAGNOSIS — Z98.890 S/P ABLATION OF VENTRICULAR ARRHYTHMIA: ICD-10-CM

## 2019-02-18 DIAGNOSIS — Z79.01 CURRENT USE OF LONG TERM ANTICOAGULATION: ICD-10-CM

## 2019-02-18 DIAGNOSIS — Z95.2 S/P AVR (AORTIC VALVE REPLACEMENT): ICD-10-CM

## 2019-02-18 PROCEDURE — 99214 PR OFFICE/OUTPT VISIT, EST, LEVL IV, 30-39 MIN: ICD-10-PCS | Mod: S$PBB,,, | Performed by: INTERNAL MEDICINE

## 2019-02-18 PROCEDURE — 93010 ELECTROCARDIOGRAM REPORT: CPT | Mod: S$PBB,,, | Performed by: INTERNAL MEDICINE

## 2019-02-18 PROCEDURE — 99999 PR PBB SHADOW E&M-EST. PATIENT-LVL III: CPT | Mod: PBBFAC,,, | Performed by: INTERNAL MEDICINE

## 2019-02-18 PROCEDURE — 99999 PR PBB SHADOW E&M-EST. PATIENT-LVL III: ICD-10-PCS | Mod: PBBFAC,,, | Performed by: INTERNAL MEDICINE

## 2019-02-18 PROCEDURE — 99213 OFFICE O/P EST LOW 20 MIN: CPT | Mod: PBBFAC,25 | Performed by: INTERNAL MEDICINE

## 2019-02-18 PROCEDURE — 99214 OFFICE O/P EST MOD 30 MIN: CPT | Mod: S$PBB,,, | Performed by: INTERNAL MEDICINE

## 2019-02-18 PROCEDURE — 93010 RHYTHM STRIP: ICD-10-PCS | Mod: S$PBB,,, | Performed by: INTERNAL MEDICINE

## 2019-02-18 PROCEDURE — 93005 ELECTROCARDIOGRAM TRACING: CPT | Mod: PBBFAC | Performed by: INTERNAL MEDICINE

## 2019-02-18 NOTE — PROGRESS NOTES
Subjective:     HPI    Cardiologist: Jamie Mehta MD    I had the pleasure of seeing Donavan Mathews in follow-up today for his history of VT and CRT-D in situ. He is a 78 year old male with a history of persistent AF, mixed cardiomyopathy (EF 25%), CRT-D, CABG x1 (SVG-RCA) and mechanical AVR in 1990 (on Coumadin), restrictive lung disease associated with Amiodarone (10 years total use, stopped in 10/2018), and VT status-post multiple ICD shocks, who was seen by me in 11/2018 for evaluation for VT ablation. In 11/2018, an EP study and VT ablation was performed. A detailed voltage map of the LV showed normal voltage on the anterior wall, lateral wall, and apex. An area of scar was seen extending from the basal to mid-septum down and around to the inferior wall. While mapping in this area, the patient went into VT  ms which was right bundle left superior axis and concordant across the precordium. This was terminated with a 200J external shock. Ultimately, VT ablation was performed, focusing on LAVA potentials in the low voltage region. At the time of his VT ablation it was noted his LV lead was in the main body of the CS. The following day an LV lead revision was performed.    I reviewed today's device interrogation, which shows stable device and lead function, and several runs of NSVT. Additionally, he was in persistent AF until mid-January, at which time he spontaneously reverted to sinus rhythm.    My interpretation of today's ECG is sinus rhythm with biv pacing.    Review of Systems   Constitution: Positive for malaise/fatigue. Negative for decreased appetite, weight gain and weight loss.   HENT: Negative for sore throat.    Eyes: Negative for blurred vision.   Cardiovascular: Positive for dyspnea on exertion. Negative for chest pain, irregular heartbeat, leg swelling, near-syncope, orthopnea, palpitations, paroxysmal nocturnal dyspnea and syncope.   Respiratory: Negative for shortness of breath.    Skin:  Negative for rash.   Musculoskeletal: Negative for arthritis.   Gastrointestinal: Negative for abdominal pain.   Neurological: Negative for focal weakness.   Psychiatric/Behavioral: Negative for altered mental status.        Objective:    Physical Exam   Constitutional: He is oriented to person, place, and time. He appears well-developed and well-nourished. No distress.   HENT:   Head: Normocephalic and atraumatic.   Mouth/Throat: Oropharynx is clear and moist.   Eyes: Pupils are equal, round, and reactive to light. No scleral icterus.   Neck: Neck supple. No thyromegaly present.   Cardiovascular: Normal rate, regular rhythm, normal heart sounds and normal pulses. Exam reveals no gallop and no friction rub.   No murmur heard.  Pulmonary/Chest: Effort normal and breath sounds normal. He has no rales.   Abdominal: Soft. Bowel sounds are normal. He exhibits no distension. There is no tenderness.   Musculoskeletal: He exhibits no edema.   Neurological: He is alert and oriented to person, place, and time.   Skin: Skin is warm and dry. No rash noted.   Psychiatric: He has a normal mood and affect. His behavior is normal.   Vitals reviewed.        Assessment:       1. VT (ventricular tachycardia)    2. S/P ablation of ventricular arrhythmia    3. Hx of CABG    4. S/P AVR (aortic valve replacement)    5. Persistent atrial fibrillation    6. Current use of long term anticoagulation         Plan:       In summary, Donavan Mathews is a 78 year old male with a mixed cardiomyopathy, CABG, mechanical AVR, and VT refractory to Amiodarone. He is now status-post VT ablation, with no VT events since that time. Mr. Mathews spontaneously converted to sinus rhythm. It is unclear whether he has noted a symptom improvement associated with sinus rhythm restoration.    The plan is for follow-up in 6 months.    Thank you for allowing me to participate in the care of this patient. Please do not hesitate to call me with any questions or  concerns.

## 2019-04-02 ENCOUNTER — HISTORICAL (OUTPATIENT)
Dept: LAB | Facility: HOSPITAL | Age: 79
End: 2019-04-02

## 2019-04-05 LAB — FINAL CULTURE: NORMAL

## 2019-04-06 LAB — FINAL CULTURE: NORMAL

## 2019-04-15 ENCOUNTER — HISTORICAL (OUTPATIENT)
Dept: LAB | Facility: HOSPITAL | Age: 79
End: 2019-04-15

## 2019-04-15 LAB
INR PPP: 6.3
PROTHROMBIN TIME: 54.3 SECOND(S) (ref 8.6–10.1)

## 2019-04-17 ENCOUNTER — HISTORICAL (OUTPATIENT)
Dept: LAB | Facility: HOSPITAL | Age: 79
End: 2019-04-17

## 2019-04-17 LAB
INR PPP: 2.4
PROTHROMBIN TIME: 21.8 SECOND(S) (ref 8.6–10.1)

## 2019-04-18 ENCOUNTER — HISTORICAL (OUTPATIENT)
Dept: LAB | Facility: HOSPITAL | Age: 79
End: 2019-04-18

## 2019-04-18 LAB
INR PPP: 2.1
PROTHROMBIN TIME: 19.3 SECOND(S) (ref 8.6–10.1)

## 2019-05-21 ENCOUNTER — HISTORICAL (OUTPATIENT)
Dept: LAB | Facility: HOSPITAL | Age: 79
End: 2019-05-21

## 2019-05-22 ENCOUNTER — HISTORICAL (OUTPATIENT)
Dept: LAB | Facility: HOSPITAL | Age: 79
End: 2019-05-22

## 2019-05-22 LAB
ABS NEUT (OLG): 6.82
BASOPHILS # BLD AUTO: 0.02 X10(3)/MCL
BASOPHILS NFR BLD AUTO: 0.2 %
BUN SERPL-MCNC: 21 MG/DL (ref 7–18)
CALCIUM SERPL-MCNC: 8.6 MG/DL (ref 8.5–10.1)
CHLORIDE SERPL-SCNC: 103 MMOL/L (ref 98–107)
CO2 SERPL-SCNC: 32 MMOL/L (ref 21–32)
CREAT SERPL-MCNC: 1.22 MG/DL (ref 0.7–1.3)
CREAT/UREA NIT SERPL: 17
EOSINOPHIL # BLD AUTO: 0.17 X10(3)/MCL
EOSINOPHIL NFR BLD AUTO: 2 %
ERYTHROCYTE [DISTWIDTH] IN BLOOD BY AUTOMATED COUNT: 15 %
GLUCOSE SERPL-MCNC: 124 MG/DL (ref 74–106)
HCT VFR BLD AUTO: 46.8 % (ref 39–49)
HGB BLD-MCNC: 14.8 GM/DL (ref 12.6–16.6)
IMM GRANULOCYTES # BLD AUTO: 0.03 10*3/UL (ref 0–0.1)
IMM GRANULOCYTES NFR BLD AUTO: 0.3 % (ref 0–1)
LYMPHOCYTES # BLD AUTO: 0.81 X10(3)/MCL
LYMPHOCYTES NFR BLD AUTO: 9.4 %
MCH RBC QN AUTO: 31.6 PG (ref 27–33)
MCHC RBC AUTO-ENTMCNC: 31.6 GM/DL (ref 32–35)
MCV RBC AUTO: 99.8 FL (ref 84–97)
MONOCYTES # BLD AUTO: 0.75 X10(3)/MCL
MONOCYTES NFR BLD AUTO: 8.7 %
NEUTROPHILS # BLD AUTO: 6.82 X10(3)/MCL
NEUTROPHILS NFR BLD AUTO: 79.4 %
PLATELET # BLD AUTO: 200 X10(3)/MCL (ref 151–368)
PMV BLD AUTO: 11 FL
POTASSIUM SERPL-SCNC: 3.5 MMOL/L (ref 3.5–5.1)
RBC # BLD AUTO: 4.69 X10(6)/MCL (ref 4.3–5.6)
SODIUM SERPL-SCNC: 142 MMOL/L (ref 136–145)
WBC # SPEC AUTO: 8.6 X10(3)/MCL (ref 3.4–9.2)

## 2019-06-03 ENCOUNTER — HISTORICAL (OUTPATIENT)
Dept: ENDOSCOPY | Facility: HOSPITAL | Age: 79
End: 2019-06-03

## 2019-06-10 ENCOUNTER — HISTORICAL (OUTPATIENT)
Dept: LAB | Facility: HOSPITAL | Age: 79
End: 2019-06-10

## 2019-06-10 LAB
INR PPP: 1.6
PROTHROMBIN TIME: 14.8 SECOND(S) (ref 8.6–10.1)

## 2019-06-28 ENCOUNTER — TELEPHONE (OUTPATIENT)
Dept: ELECTROPHYSIOLOGY | Facility: CLINIC | Age: 79
End: 2019-06-28

## 2019-06-28 NOTE — TELEPHONE ENCOUNTER
----- Message from Betty Gutierres sent at 6/28/2019  9:42 AM CDT -----  Contact: Patient  The Pt is calling to schedule his recall appointment and he will like to see if he can have it on the week of 8/12. Please call him back @ [phone]. Thanks, Betty- the pt said if no answer please leave a full detailed message on his machine.

## 2019-06-28 NOTE — TELEPHONE ENCOUNTER
Called & scheduled pts 6m f/u with GP, device check & EKG prior; scheduled apts with pt- 9/9 8:15AM EKG, 8:40AM device check & 9:00AM Dr. Aguayo apt.  Pt confirmed apts & adv he didn't need apt reminder ltr sent to him bc he is writing it down on his last AVS paperwork.  Adv pt to call if he needs anything, & if not, we will see him at his apts.

## 2019-07-15 ENCOUNTER — HISTORICAL (OUTPATIENT)
Dept: LAB | Facility: HOSPITAL | Age: 79
End: 2019-07-15

## 2019-07-15 LAB
BUN SERPL-MCNC: 22 MG/DL (ref 7–18)
CALCIUM SERPL-MCNC: 9.2 MG/DL (ref 8.5–10.1)
CHLORIDE SERPL-SCNC: 100 MMOL/L (ref 98–107)
CO2 SERPL-SCNC: 34 MMOL/L (ref 21–32)
CREAT SERPL-MCNC: 1.13 MG/DL (ref 0.7–1.3)
CREAT/UREA NIT SERPL: 19
GLUCOSE SERPL-MCNC: 108 MG/DL (ref 74–106)
POTASSIUM SERPL-SCNC: 4.6 MMOL/L (ref 3.5–5.1)
SODIUM SERPL-SCNC: 140 MMOL/L (ref 136–145)

## 2019-08-07 ENCOUNTER — HISTORICAL (OUTPATIENT)
Dept: INTENSIVE CARE | Facility: HOSPITAL | Age: 79
End: 2019-08-07

## 2019-08-27 ENCOUNTER — HISTORICAL (OUTPATIENT)
Dept: INTENSIVE CARE | Facility: HOSPITAL | Age: 79
End: 2019-08-27

## 2019-09-08 NOTE — PROGRESS NOTES
Subjective:     HPI    Cardiologist: Jamie Mehta MD at [facility].     I had the pleasure of seeing Donavan Mathews in follow-up today for his history of VT and CRT-D in situ.   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 Amiodarone (10 years total use, stopped in 10/2018), and VT status-post multiple ICD  shocks in 10/2018, who was seen in 11/2018  for VT ablation. In 11/2018, an EP study and VT ablation was performed. A detailed voltage map of the LV showed normal voltage on the anterior wall, lateral wall, and apex. An area of scar was seen extending from the basal to mid-septum down and around to the inferior wall. While mapping in this area, the patient went into VT  ms which was right bundle left superior axis and concordant across the precordium. This was terminated with a 200J external shock. Ultimately, VT ablation was performed, focusing on LAVA potentials in the low voltage region. At the time of his VT ablation it was noted his LV lead was in the main body of the CS. The following day an LV lead revision was performed. At his 2/2019 visit a device interrogation showed no sustained VT. He has been in 100% Afib since last seen.    He has been followed by her pulmonologist and cardiologist at [facility]. He had a recent PFT and reports he was told he has COPD, but no concerns for RLD. (no records available). Overall he is feeling much better and has been improving. No VT or ICD shocks since 11/2018. He has been back on amio 200 qd since then.     I reviewed today's device interrogation, which shows no VT, 100% Afib, 100% BIV paced     My interpretation of today's ECG is Afib with 100% biv pacing.    Review of Systems   Constitution: Positive for malaise/fatigue. Negative for decreased appetite, weight gain and weight loss.   HENT: Negative for  sore throat.    Eyes: Negative for blurred vision.   Cardiovascular: Positive for dyspnea on exertion. Negative for chest pain, irregular heartbeat, leg swelling, near-syncope, orthopnea, palpitations, paroxysmal nocturnal dyspnea and syncope.   Respiratory: Negative for shortness of breath.    Skin: Negative for rash.   Musculoskeletal: Negative for arthritis.   Gastrointestinal: Negative for abdominal pain.   Neurological: Negative for focal weakness.   Psychiatric/Behavioral: Negative for altered mental status.        Objective:    Physical Exam   Constitutional: He is oriented to person, place, and time. He appears well-developed and well-nourished. No distress.   HENT:   Head: Normocephalic and atraumatic.   Mouth/Throat: Oropharynx is clear and moist.   Eyes: Pupils are equal, round, and reactive to light. No scleral icterus.   Neck: Neck supple. No thyromegaly present.   Cardiovascular: Normal rate, regular rhythm, normal heart sounds and normal pulses. Exam reveals no gallop and no friction rub.   No murmur heard.  Pulmonary/Chest: Effort normal and breath sounds normal. He has no rales.   Abdominal: Soft. Bowel sounds are normal. He exhibits no distension. There is no tenderness.   Musculoskeletal: He exhibits no edema.   Neurological: He is alert and oriented to person, place, and time.   Skin: Skin is warm and dry. No rash noted.   Psychiatric: He has a normal mood and affect. His behavior is normal.   Vitals reviewed.        Assessment:       1. VT (ventricular tachycardia)    2. S/P ablation of ventricular arrhythmia    3. Persistent atrial fibrillation    4. Hx of CABG    5. S/P AVR (aortic valve replacement)    6. Ischemic cardiomyopathy    7. Current use of long term anticoagulation         Plan:       In summary, Donavan Mathews is a 78 year old male with a mixed cardiomyopathy, CABG, mechanical AVR, and VT refractory to Amiodarone. He is now status-post VT ablation, with no VT events since that time.    He has a pulmonologist at [facility], he had a recent PFT, he was told he has COPD but does not report any for RLD. (no records available).  No VT or ICD shocks since 11/2018. He has been back on amio 200 qd since then. He has been in 100% Afib with CHB and 100% BIV paced since last seen. Overall he is feeling much better and has been improving.    - He is stable and doing well. cont amiodarone 200 qd. If pulmonary team has any concerns with amio in future, we will consider stopping it. He reports he has f/u of PFT/TFT/LFT at [facility].   - RTC for device check per protocol .    The plan is for follow-up in 1 yr or earlier if there is any concern. Thank you for allowing me to participate in the care of this patient. Please do not hesitate to call me with any questions or concerns.

## 2019-09-09 ENCOUNTER — CLINICAL SUPPORT (OUTPATIENT)
Dept: CARDIOLOGY | Facility: HOSPITAL | Age: 79
End: 2019-09-09
Attending: INTERNAL MEDICINE
Payer: MEDICARE

## 2019-09-09 ENCOUNTER — HOSPITAL ENCOUNTER (OUTPATIENT)
Dept: CARDIOLOGY | Facility: CLINIC | Age: 79
Discharge: HOME | End: 2019-09-09
Payer: MEDICARE

## 2019-09-09 ENCOUNTER — OFFICE VISIT (OUTPATIENT)
Dept: ELECTROPHYSIOLOGY | Facility: CLINIC | Age: 79
End: 2019-09-09
Payer: MEDICARE

## 2019-09-09 VITALS
BODY MASS INDEX: 22.19 KG/M2 | SYSTOLIC BLOOD PRESSURE: 100 MMHG | WEIGHT: 163.81 LBS | HEIGHT: 72 IN | HEART RATE: 82 BPM | DIASTOLIC BLOOD PRESSURE: 64 MMHG

## 2019-09-09 DIAGNOSIS — I48.19 PERSISTENT ATRIAL FIBRILLATION: ICD-10-CM

## 2019-09-09 DIAGNOSIS — Z86.79 S/P ABLATION OF VENTRICULAR ARRHYTHMIA: ICD-10-CM

## 2019-09-09 DIAGNOSIS — Z98.890 S/P ABLATION OF VENTRICULAR ARRHYTHMIA: ICD-10-CM

## 2019-09-09 DIAGNOSIS — Z95.2 S/P AVR (AORTIC VALVE REPLACEMENT): ICD-10-CM

## 2019-09-09 DIAGNOSIS — I25.5 ISCHEMIC CARDIOMYOPATHY: ICD-10-CM

## 2019-09-09 DIAGNOSIS — Z79.01 CURRENT USE OF LONG TERM ANTICOAGULATION: ICD-10-CM

## 2019-09-09 DIAGNOSIS — I47.20 VT (VENTRICULAR TACHYCARDIA): Primary | ICD-10-CM

## 2019-09-09 DIAGNOSIS — I47.20 VENTRICULAR TACHYCARDIA: ICD-10-CM

## 2019-09-09 DIAGNOSIS — Z95.1 HX OF CABG: ICD-10-CM

## 2019-09-09 DIAGNOSIS — I47.20 VT (VENTRICULAR TACHYCARDIA): ICD-10-CM

## 2019-09-09 DIAGNOSIS — Z95.810 PRESENCE OF AUTOMATIC CARDIOVERTER/DEFIBRILLATOR (AICD): ICD-10-CM

## 2019-09-09 PROCEDURE — 93284 PRGRMG EVAL IMPLANTABLE DFB: CPT

## 2019-09-09 PROCEDURE — 93010 RHYTHM STRIP: ICD-10-PCS | Mod: S$PBB,,, | Performed by: INTERNAL MEDICINE

## 2019-09-09 PROCEDURE — 99999 PR PBB SHADOW E&M-EST. PATIENT-LVL IV: ICD-10-PCS | Mod: PBBFAC,,, | Performed by: INTERNAL MEDICINE

## 2019-09-09 PROCEDURE — 93010 ELECTROCARDIOGRAM REPORT: CPT | Mod: S$PBB,,, | Performed by: INTERNAL MEDICINE

## 2019-09-09 PROCEDURE — 99214 OFFICE O/P EST MOD 30 MIN: CPT | Mod: S$PBB,,, | Performed by: INTERNAL MEDICINE

## 2019-09-09 PROCEDURE — 99999 PR PBB SHADOW E&M-EST. PATIENT-LVL IV: CPT | Mod: PBBFAC,,, | Performed by: INTERNAL MEDICINE

## 2019-09-09 PROCEDURE — 99214 OFFICE O/P EST MOD 30 MIN: CPT | Mod: PBBFAC,25 | Performed by: INTERNAL MEDICINE

## 2019-09-09 PROCEDURE — 93005 ELECTROCARDIOGRAM TRACING: CPT | Mod: PBBFAC,59 | Performed by: INTERNAL MEDICINE

## 2019-09-09 PROCEDURE — 99214 PR OFFICE/OUTPT VISIT, EST, LEVL IV, 30-39 MIN: ICD-10-PCS | Mod: S$PBB,,, | Performed by: INTERNAL MEDICINE

## 2019-09-09 RX ORDER — TORSEMIDE 20 MG/1
40 TABLET ORAL DAILY
COMMUNITY
Start: 2019-09-05

## 2019-09-16 ENCOUNTER — HISTORICAL (OUTPATIENT)
Dept: LAB | Facility: HOSPITAL | Age: 79
End: 2019-09-16

## 2019-09-16 LAB
BUN SERPL-MCNC: 28 MG/DL (ref 7–18)
CALCIUM SERPL-MCNC: 8.7 MG/DL (ref 8.5–10.1)
CHLORIDE SERPL-SCNC: 102 MMOL/L (ref 98–107)
CO2 SERPL-SCNC: 34.1 MMOL/L (ref 21–32)
CREAT SERPL-MCNC: 1.26 MG/DL (ref 0.7–1.3)
CREAT/UREA NIT SERPL: 22
GLUCOSE SERPL-MCNC: 105 MG/DL (ref 74–106)
POTASSIUM SERPL-SCNC: 3.8 MMOL/L (ref 3.5–5.1)
SODIUM SERPL-SCNC: 141 MMOL/L (ref 136–145)

## 2019-11-18 ENCOUNTER — HISTORICAL (OUTPATIENT)
Dept: LAB | Facility: HOSPITAL | Age: 79
End: 2019-11-18

## 2019-11-18 LAB
BUN SERPL-MCNC: 28 MG/DL (ref 7–18)
CALCIUM SERPL-MCNC: 8.9 MG/DL (ref 8.5–10.1)
CHLORIDE SERPL-SCNC: 102 MMOL/L (ref 98–107)
CO2 SERPL-SCNC: 34 MMOL/L (ref 21–32)
CREAT SERPL-MCNC: 1.65 MG/DL (ref 0.7–1.3)
CREAT/UREA NIT SERPL: 17
GLUCOSE SERPL-MCNC: 138 MG/DL (ref 74–106)
POTASSIUM SERPL-SCNC: 4.2 MMOL/L (ref 3.5–5.1)
SODIUM SERPL-SCNC: 142 MMOL/L (ref 136–145)

## 2020-01-29 ENCOUNTER — HISTORICAL (OUTPATIENT)
Dept: RADIOLOGY | Facility: HOSPITAL | Age: 80
End: 2020-01-29

## 2020-02-06 ENCOUNTER — HISTORICAL (OUTPATIENT)
Dept: LAB | Facility: HOSPITAL | Age: 80
End: 2020-02-06

## 2020-02-06 LAB
BUN SERPL-MCNC: 31 MG/DL (ref 8.4–25.7)
CALCIUM SERPL-MCNC: 8.9 MG/DL (ref 8.8–10)
CHLORIDE SERPL-SCNC: 101 MMOL/L (ref 98–107)
CO2 SERPL-SCNC: 31 MEQ/L (ref 23–31)
CREAT SERPL-MCNC: 1.61 MG/DL (ref 0.73–1.18)
CREAT/UREA NIT SERPL: 19
GLUCOSE SERPL-MCNC: 102 MG/DL (ref 82–115)
POTASSIUM SERPL-SCNC: 4.1 MMOL/L (ref 3.5–5.1)
SODIUM SERPL-SCNC: 141 MMOL/L (ref 136–145)

## 2020-11-24 ENCOUNTER — HISTORICAL (OUTPATIENT)
Dept: LAB | Facility: HOSPITAL | Age: 80
End: 2020-11-24

## 2020-11-24 LAB
ALBUMIN SERPL-MCNC: 3.3 GM/DL (ref 3.4–4.8)
ALBUMIN/GLOB SERPL: 0.8 RATIO (ref 1.1–2)
ALP SERPL-CCNC: 139 UNIT/L (ref 40–150)
ALT SERPL-CCNC: 63 UNIT/L (ref 0–55)
AST SERPL-CCNC: 68 UNIT/L (ref 5–34)
BILIRUB SERPL-MCNC: 0.7 MG/DL
BILIRUBIN DIRECT+TOT PNL SERPL-MCNC: 0.3 MG/DL
BILIRUBIN DIRECT+TOT PNL SERPL-MCNC: 0.4 MG/DL (ref 0–0.5)
BUN SERPL-MCNC: 32 MG/DL (ref 8.4–25.7)
CALCIUM SERPL-MCNC: 8.8 MG/DL (ref 8.8–10)
CHLORIDE SERPL-SCNC: 103 MMOL/L (ref 98–107)
CO2 SERPL-SCNC: 32 MEQ/L (ref 23–31)
CREAT SERPL-MCNC: 1.53 MG/DL (ref 0.73–1.18)
GLOBULIN SER-MCNC: 4.2 GM/DL (ref 2.4–3.5)
GLUCOSE SERPL-MCNC: 105 MG/DL (ref 82–115)
POTASSIUM SERPL-SCNC: 4.2 MMOL/L (ref 3.5–5.1)
PROT SERPL-MCNC: 7.5 GM/DL (ref 5.8–7.6)
SODIUM SERPL-SCNC: 144 MMOL/L (ref 136–145)

## 2021-01-21 ENCOUNTER — HISTORICAL (OUTPATIENT)
Dept: LAB | Facility: HOSPITAL | Age: 81
End: 2021-01-21

## 2021-01-21 LAB — SARS-COV-2 RNA RESP QL NAA+PROBE: NOT DETECTED

## 2021-02-22 ENCOUNTER — HISTORICAL (OUTPATIENT)
Dept: LAB | Facility: HOSPITAL | Age: 81
End: 2021-02-22

## 2021-02-22 LAB
ALBUMIN SERPL-MCNC: 3.2 GM/DL (ref 3.4–4.8)
ALP SERPL-CCNC: 152 UNIT/L (ref 40–150)
ALT SERPL-CCNC: 70 UNIT/L (ref 0–55)
AST SERPL-CCNC: 75 UNIT/L (ref 5–34)
BILIRUB SERPL-MCNC: 0.9 MG/DL
BILIRUBIN DIRECT+TOT PNL SERPL-MCNC: 0.4 MG/DL
BILIRUBIN DIRECT+TOT PNL SERPL-MCNC: 0.5 MG/DL (ref 0–0.5)
DIGOXIN SERPL-MCNC: 0.8 NG/ML (ref 0.8–2)
PROT SERPL-MCNC: 7.2 GM/DL (ref 5.8–7.6)

## 2021-05-25 NOTE — ASSESSMENT & PLAN NOTE
78 yr old man with PMH of persistent Afib, Mixed CM Ef 25% s/p [employer] CRTD upgrade 1/17, S/p  Mechanical AVR in 1990, s/p 1v CABG(SVG to RCA) 1990,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     Post procedure Xray w/o pneumothorax (abdominal Xray with extension into chest)  Consent for LV lead revision left on chart     F/U EP recs:  -Continue dig, amiodarone  -Hold dilt and BB  -NPO for LV lead revision and venogram in the AM  -Continue weaning epi as tolerated  -Continue intubation/sedation for now, likely till after the EP procedures unless otherwise indicated.   Moderate Variability
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
## 2022-04-10 ENCOUNTER — HISTORICAL (OUTPATIENT)
Dept: ADMINISTRATIVE | Facility: HOSPITAL | Age: 82
End: 2022-04-10
Payer: MEDICARE

## 2022-04-29 VITALS
SYSTOLIC BLOOD PRESSURE: 124 MMHG | BODY MASS INDEX: 21.67 KG/M2 | WEIGHT: 160 LBS | HEIGHT: 72 IN | DIASTOLIC BLOOD PRESSURE: 74 MMHG

## 2022-06-11 NOTE — PLAN OF CARE
Problem: Patient Care Overview  Goal: Plan of Care Review  Outcome: Ongoing (interventions implemented as appropriate)  POC reviewed with pt with all questions answered. VSS and pt remains free from pain. Immobilizer in use on R arm. Pt receives 200mg PO amiodarone daily. No vtach noted during shift. Fall bundle implemented and pt remains free from falls. Will continue to monitor.        Dr. Navas

## 2024-03-02 NOTE — PROGRESS NOTES
Consent obtained. 20g sl started in right wrist for optison use. optison given ivp via sl for imaging. Denies transfusion rxn. Tolerated well.  Sl left in place as pt has rfa in ep lab next. Report called to secret rn in [facility] that sl left in place. Failed attempt in right forearm . Pressure applied.  
Thom

## (undated) DEVICE — GUIDE WIRE BMW 014 X190

## (undated) DEVICE — INTRO 8.5FR 63CM SRO

## (undated) DEVICE — SHEATH INTRODUCER 9FR 11CM

## (undated) DEVICE — SLING AND SWATHE UNIV ADLT

## (undated) DEVICE — NDL TRNSSPTL BRK-1 18GA 98CM

## (undated) DEVICE — SEALER AQUAMANTYS 2.3 BIPOLAR

## (undated) DEVICE — TRAY FOLEY 16FR INFECTION CONT

## (undated) DEVICE — INTRODUCER HEMOSTASIS 7.5F

## (undated) DEVICE — SHEATH HEMOSTASIS 8.5FR

## (undated) DEVICE — PACK PACER PERMANENT

## (undated) DEVICE — REPROCESSED CATH ACUNAV 8FR

## (undated) DEVICE — ADHESIVE DERMABOND ADVANCED

## (undated) DEVICE — CATH RESPONSE QPLR JSN 6F 120

## (undated) DEVICE — KIT SELECTRA ACCESSORY

## (undated) DEVICE — CATH BLLN COR SINUS VENOGRAPHY

## (undated) DEVICE — ELECTRODE POLYHESIVEPRE-ATTACH

## (undated) DEVICE — WIRE WHISPER MS 014 X 190

## (undated) DEVICE — HOOK SELECTRA 45

## (undated) DEVICE — INTRODUCER AGILIS LG CURL

## (undated) DEVICE — PAD DEFIB CADENCE ADULT R2

## (undated) DEVICE — SLING SWATHE UNIVERSAL FOAM

## (undated) DEVICE — OIL SILICONE SJM

## (undated) DEVICE — NDL PERCUTANEOUS ENTRYBSDN 18

## (undated) DEVICE — CATH THERMOCOOL SMTCH SF F J

## (undated) DEVICE — PATCH CARTO REFERENCE

## (undated) DEVICE — KIT WRENCH

## (undated) DEVICE — SET SMARTABLATE IRR TUBE

## (undated) DEVICE — BLADE PLASMA WIDE SPATULA TIP

## (undated) DEVICE — PACK EP DRAPE

## (undated) DEVICE — Device

## (undated) DEVICE — SHEATH SAFE ULTRA 9FR

## (undated) DEVICE — GUIDEWIRE STD .035X180CM STR